# Patient Record
Sex: MALE | Race: BLACK OR AFRICAN AMERICAN | NOT HISPANIC OR LATINO
[De-identification: names, ages, dates, MRNs, and addresses within clinical notes are randomized per-mention and may not be internally consistent; named-entity substitution may affect disease eponyms.]

---

## 2017-02-24 ENCOUNTER — APPOINTMENT (OUTPATIENT)
Dept: OTHER | Facility: CLINIC | Age: 62
End: 2017-02-24

## 2017-02-24 VITALS
HEIGHT: 68 IN | BODY MASS INDEX: 29.55 KG/M2 | DIASTOLIC BLOOD PRESSURE: 69 MMHG | HEART RATE: 81 BPM | SYSTOLIC BLOOD PRESSURE: 109 MMHG | OXYGEN SATURATION: 97 % | WEIGHT: 195 LBS

## 2017-02-27 LAB
ALBUMIN SERPL ELPH-MCNC: 4.6 G/DL
ALP BLD-CCNC: 48 U/L
ALT SERPL-CCNC: 34 U/L
ANION GAP SERPL CALC-SCNC: 13 MMOL/L
APPEARANCE: CLEAR
AST SERPL-CCNC: 24 U/L
BASOPHILS # BLD AUTO: 0.03 K/UL
BASOPHILS NFR BLD AUTO: 0.7 %
BILIRUB SERPL-MCNC: 0.3 MG/DL
BILIRUBIN URINE: NEGATIVE
BLOOD URINE: NEGATIVE
BUN SERPL-MCNC: 16 MG/DL
CALCIUM SERPL-MCNC: 9.6 MG/DL
CHLORIDE SERPL-SCNC: 101 MMOL/L
CHOLEST SERPL-MCNC: 241 MG/DL
CHOLEST/HDLC SERPL: 3.5 RATIO
CO2 SERPL-SCNC: 28 MMOL/L
COLOR: YELLOW
CREAT SERPL-MCNC: 1.29 MG/DL
EOSINOPHIL # BLD AUTO: 0.44 K/UL
EOSINOPHIL NFR BLD AUTO: 10.4 %
GLUCOSE QUALITATIVE U: NORMAL MG/DL
GLUCOSE SERPL-MCNC: 99 MG/DL
HCT VFR BLD CALC: 43.2 %
HDLC SERPL-MCNC: 68 MG/DL
HGB BLD-MCNC: 14.1 G/DL
IMM GRANULOCYTES NFR BLD AUTO: 0 %
KETONES URINE: NEGATIVE
LDLC SERPL CALC-MCNC: 151 MG/DL
LEUKOCYTE ESTERASE URINE: NEGATIVE
LYMPHOCYTES # BLD AUTO: 2.15 K/UL
LYMPHOCYTES NFR BLD AUTO: 50.6 %
MAN DIFF?: NORMAL
MCHC RBC-ENTMCNC: 31.3 PG
MCHC RBC-ENTMCNC: 32.6 GM/DL
MCV RBC AUTO: 95.8 FL
MONOCYTES # BLD AUTO: 0.36 K/UL
MONOCYTES NFR BLD AUTO: 8.5 %
NEUTROPHILS # BLD AUTO: 1.27 K/UL
NEUTROPHILS NFR BLD AUTO: 29.8 %
NITRITE URINE: NEGATIVE
PH URINE: 6
PLATELET # BLD AUTO: 245 K/UL
POTASSIUM SERPL-SCNC: 4.6 MMOL/L
PROT SERPL-MCNC: 7.5 G/DL
PROTEIN URINE: NEGATIVE MG/DL
RBC # BLD: 4.51 M/UL
RBC # FLD: 13.6 %
SODIUM SERPL-SCNC: 142 MMOL/L
SPECIFIC GRAVITY URINE: 1.02
TRIGL SERPL-MCNC: 111 MG/DL
UROBILINOGEN URINE: NORMAL MG/DL
WBC # FLD AUTO: 4.25 K/UL

## 2017-04-04 ENCOUNTER — APPOINTMENT (OUTPATIENT)
Dept: UROLOGY | Facility: CLINIC | Age: 62
End: 2017-04-04

## 2017-04-04 DIAGNOSIS — Z80.42 FAMILY HISTORY OF MALIGNANT NEOPLASM OF PROSTATE: ICD-10-CM

## 2017-04-05 ENCOUNTER — TRANSCRIPTION ENCOUNTER (OUTPATIENT)
Age: 62
End: 2017-04-05

## 2017-04-05 PROBLEM — Z80.42 FAMILY HISTORY OF MALIGNANT NEOPLASM OF PROSTATE: Status: ACTIVE | Noted: 2017-04-04

## 2017-04-05 LAB
PSA FREE FLD-MCNC: 17.8 %
PSA FREE SERPL-MCNC: 1.27 NG/ML
PSA SERPL-MCNC: 7.12 NG/ML

## 2017-05-11 ENCOUNTER — FORM ENCOUNTER (OUTPATIENT)
Age: 62
End: 2017-05-11

## 2017-05-12 ENCOUNTER — OUTPATIENT (OUTPATIENT)
Dept: OUTPATIENT SERVICES | Facility: HOSPITAL | Age: 62
LOS: 1 days | End: 2017-05-12
Payer: COMMERCIAL

## 2017-05-12 ENCOUNTER — APPOINTMENT (OUTPATIENT)
Dept: MRI IMAGING | Facility: IMAGING CENTER | Age: 62
End: 2017-05-12

## 2017-05-12 DIAGNOSIS — R97.20 ELEVATED PROSTATE SPECIFIC ANTIGEN [PSA]: ICD-10-CM

## 2017-05-12 PROCEDURE — 72197 MRI PELVIS W/O & W/DYE: CPT

## 2017-05-12 PROCEDURE — A9585: CPT

## 2017-05-23 ENCOUNTER — MESSAGE (OUTPATIENT)
Age: 62
End: 2017-05-23

## 2017-05-23 ENCOUNTER — MEDICATION RENEWAL (OUTPATIENT)
Age: 62
End: 2017-05-23

## 2017-05-30 ENCOUNTER — MESSAGE (OUTPATIENT)
Age: 62
End: 2017-05-30

## 2017-06-02 ENCOUNTER — APPOINTMENT (OUTPATIENT)
Dept: UROLOGY | Facility: CLINIC | Age: 62
End: 2017-06-02

## 2017-06-02 VITALS
TEMPERATURE: 98.2 F | RESPIRATION RATE: 16 BRPM | DIASTOLIC BLOOD PRESSURE: 70 MMHG | SYSTOLIC BLOOD PRESSURE: 101 MMHG | HEART RATE: 70 BPM

## 2017-06-02 RX ORDER — AMIKACIN SULFATE 250 MG/ML
500 INJECTION, SOLUTION INTRAMUSCULAR; INTRAVENOUS
Refills: 0 | Status: COMPLETED | OUTPATIENT
Start: 2017-06-02

## 2017-06-02 RX ORDER — AMIKACIN SULFATE 250 MG/ML
500 INJECTION, SOLUTION INTRAMUSCULAR; INTRAVENOUS
Qty: 2 | Refills: 0 | Status: COMPLETED | OUTPATIENT
Start: 2017-06-02 | End: 2017-06-02

## 2017-06-02 RX ADMIN — AMIKACIN SULFATE 0 MG/2ML: 250 INJECTION, SOLUTION INTRAMUSCULAR; INTRAVENOUS at 00:00

## 2017-06-06 ENCOUNTER — OUTPATIENT (OUTPATIENT)
Dept: OUTPATIENT SERVICES | Facility: HOSPITAL | Age: 62
LOS: 1 days | End: 2017-06-06

## 2017-06-06 ENCOUNTER — MEDICATION RENEWAL (OUTPATIENT)
Age: 62
End: 2017-06-06

## 2017-06-06 VITALS
OXYGEN SATURATION: 99 % | RESPIRATION RATE: 14 BRPM | DIASTOLIC BLOOD PRESSURE: 78 MMHG | TEMPERATURE: 97 F | WEIGHT: 190.92 LBS | HEIGHT: 67.5 IN | SYSTOLIC BLOOD PRESSURE: 128 MMHG | HEART RATE: 89 BPM

## 2017-06-06 DIAGNOSIS — R97.20 ELEVATED PROSTATE SPECIFIC ANTIGEN [PSA]: ICD-10-CM

## 2017-06-06 DIAGNOSIS — Z98.890 OTHER SPECIFIED POSTPROCEDURAL STATES: Chronic | ICD-10-CM

## 2017-06-06 LAB
APPEARANCE UR: SIGNIFICANT CHANGE UP
BACTERIA # UR AUTO: SIGNIFICANT CHANGE UP
BILIRUB UR-MCNC: NEGATIVE — SIGNIFICANT CHANGE UP
BLOOD UR QL VISUAL: NEGATIVE — SIGNIFICANT CHANGE UP
BUN SERPL-MCNC: 12 MG/DL — SIGNIFICANT CHANGE UP (ref 7–23)
CALCIUM SERPL-MCNC: 9.8 MG/DL — SIGNIFICANT CHANGE UP (ref 8.4–10.5)
CHLORIDE SERPL-SCNC: 100 MMOL/L — SIGNIFICANT CHANGE UP (ref 98–107)
CO2 SERPL-SCNC: 29 MMOL/L — SIGNIFICANT CHANGE UP (ref 22–31)
COD CRY URNS QL: SIGNIFICANT CHANGE UP (ref 0–0)
COLOR SPEC: YELLOW — SIGNIFICANT CHANGE UP
CREAT SERPL-MCNC: 1.23 MG/DL — SIGNIFICANT CHANGE UP (ref 0.5–1.3)
GLUCOSE SERPL-MCNC: 96 MG/DL — SIGNIFICANT CHANGE UP (ref 70–99)
GLUCOSE UR-MCNC: NEGATIVE — SIGNIFICANT CHANGE UP
HCT VFR BLD CALC: 44.1 % — SIGNIFICANT CHANGE UP (ref 39–50)
HGB BLD-MCNC: 14 G/DL — SIGNIFICANT CHANGE UP (ref 13–17)
KETONES UR-MCNC: NEGATIVE — SIGNIFICANT CHANGE UP
LEUKOCYTE ESTERASE UR-ACNC: NEGATIVE — SIGNIFICANT CHANGE UP
MCHC RBC-ENTMCNC: 30.6 PG — SIGNIFICANT CHANGE UP (ref 27–34)
MCHC RBC-ENTMCNC: 31.7 % — LOW (ref 32–36)
MCV RBC AUTO: 96.3 FL — SIGNIFICANT CHANGE UP (ref 80–100)
MUCOUS THREADS # UR AUTO: SIGNIFICANT CHANGE UP
NITRITE UR-MCNC: NEGATIVE — SIGNIFICANT CHANGE UP
PH UR: 6 — SIGNIFICANT CHANGE UP (ref 4.6–8)
PLATELET # BLD AUTO: 265 K/UL — SIGNIFICANT CHANGE UP (ref 150–400)
PMV BLD: 10.4 FL — SIGNIFICANT CHANGE UP (ref 7–13)
POTASSIUM SERPL-MCNC: 4.4 MMOL/L — SIGNIFICANT CHANGE UP (ref 3.5–5.3)
POTASSIUM SERPL-SCNC: 4.4 MMOL/L — SIGNIFICANT CHANGE UP (ref 3.5–5.3)
PROT UR-MCNC: 20 — SIGNIFICANT CHANGE UP
RBC # BLD: 4.58 M/UL — SIGNIFICANT CHANGE UP (ref 4.2–5.8)
RBC # FLD: 13.3 % — SIGNIFICANT CHANGE UP (ref 10.3–14.5)
RBC CASTS # UR COMP ASSIST: SIGNIFICANT CHANGE UP (ref 0–?)
SODIUM SERPL-SCNC: 142 MMOL/L — SIGNIFICANT CHANGE UP (ref 135–145)
SP GR SPEC: 1.03 — SIGNIFICANT CHANGE UP (ref 1–1.03)
UROBILINOGEN FLD QL: NORMAL E.U. — SIGNIFICANT CHANGE UP (ref 0.1–0.2)
WBC # BLD: 4.87 K/UL — SIGNIFICANT CHANGE UP (ref 3.8–10.5)
WBC # FLD AUTO: 4.87 K/UL — SIGNIFICANT CHANGE UP (ref 3.8–10.5)
WBC UR QL: SIGNIFICANT CHANGE UP (ref 0–?)

## 2017-06-06 NOTE — H&P PST ADULT - NEGATIVE GENERAL GENITOURINARY SYMPTOMS
normal urinary frequency/no flank pain R/no urinary hesitancy/no flank pain L/no dysuria/no nocturia/no hematuria/no incontinence

## 2017-06-06 NOTE — H&P PST ADULT - NEGATIVE GASTROINTESTINAL SYMPTOMS
no constipation/no nausea/no vomiting/no diarrhea/no melena/no abdominal pain/no change in bowel habits

## 2017-06-06 NOTE — H&P PST ADULT - HISTORY OF PRESENT ILLNESS
61 y/o male presents to [facility] for preoperative evaluation with diagnosis of elevated prostate specific antigen (PSA).  Pt reports 1 month ago his PSA results were elevated. Sent for MRI by his Urologist which did "not show anything" but patient was told he needed a simple biopsy. He is Scheduled for Transurethral Resection Ultrasound and Prostate Biopsy on 6/7/2017. Pt denies hematuria, urinary frequency, urgency or nocturia. 59 y/o male presents to [facility] for preoperative evaluation with diagnosis of elevated prostate specific antigen (PSA).  Pt reports 1 month ago his PSA results were elevated. Sent for MRI by his Urologist which did "not show anything" but patient was told he needed a "simple biopsy". He is Scheduled for Transurethral Resection Ultrasound and Prostate Biopsy on 6/7/2017. Pt denies hematuria, urinary frequency, urgency or nocturia.

## 2017-06-06 NOTE — H&P PST ADULT - NEGATIVE MUSCULOSKELETAL SYMPTOMS
no muscle cramps/no arthralgia/no muscle weakness/no arthritis/no joint swelling/no neck pain/no back pain

## 2017-06-06 NOTE — H&P PST ADULT - RS GEN PE MLT RESP DETAILS PC
respirations non-labored/no chest wall tenderness/breath sounds equal/no wheezes/good air movement/clear to auscultation bilaterally/airway patent

## 2017-06-06 NOTE — H&P PST ADULT - NEGATIVE ENMT SYMPTOMS
no post-nasal discharge/no nose bleeds/no nasal congestion/no dysphagia/no hearing difficulty/no tinnitus/no gum bleeding/no ear pain

## 2017-06-06 NOTE — H&P PST ADULT - NSANTHOSAYNRD_GEN_A_CORE
No. MICKI screening performed.  STOP BANG Legend: 0-2 = LOW Risk; 3-4 = INTERMEDIATE Risk; 5-8 = HIGH Risk

## 2017-06-06 NOTE — H&P PST ADULT - NEGATIVE CARDIOVASCULAR SYMPTOMS
no orthopnea/no paroxysmal nocturnal dyspnea/no dyspnea on exertion/no palpitations/no chest pain/no peripheral edema/no claudication

## 2017-06-06 NOTE — H&P PST ADULT - VISION (WITH CORRECTIVE LENSES IF THE PATIENT USUALLY WEARS THEM):
wears glasses for distance and reading/Normal vision: sees adequately in most situations; can see medication labels, newsprint

## 2017-06-06 NOTE — H&P PST ADULT - NEGATIVE NEUROLOGICAL SYMPTOMS
no loss of consciousness/no vertigo/no hemiparesis/no weakness/no headache/no confusion/no syncope/no difficulty walking/no facial palsy/no generalized seizures/no paresthesias/no tremors/no loss of sensation/no transient paralysis/no focal seizures

## 2017-06-06 NOTE — H&P PST ADULT - PROBLEM SELECTOR PLAN 1
Scheduled for Transurethral Resection Ultrasound and Prostate Biopsy on 6/7/2017.  Pre op instructions given, pt verbalized understanding   GI prophylaxis provided

## 2017-06-06 NOTE — H&P PST ADULT - LYMPHATIC
supraclavicular R/supraclavicular L/anterior cervical L/anterior cervical R/posterior cervical R/posterior cervical L

## 2017-06-07 ENCOUNTER — OUTPATIENT (OUTPATIENT)
Dept: OUTPATIENT SERVICES | Facility: HOSPITAL | Age: 62
LOS: 1 days | Discharge: HOME | End: 2017-06-07
Payer: COMMERCIAL

## 2017-06-07 ENCOUNTER — APPOINTMENT (OUTPATIENT)
Dept: UROLOGY | Facility: AMBULATORY SURGERY CENTER | Age: 62
End: 2017-06-07

## 2017-06-07 ENCOUNTER — RESULT REVIEW (OUTPATIENT)
Age: 62
End: 2017-06-07

## 2017-06-07 VITALS
WEIGHT: 190.92 LBS | SYSTOLIC BLOOD PRESSURE: 119 MMHG | TEMPERATURE: 97 F | DIASTOLIC BLOOD PRESSURE: 79 MMHG | HEART RATE: 73 BPM | HEIGHT: 67.5 IN | OXYGEN SATURATION: 99 % | RESPIRATION RATE: 18 BRPM

## 2017-06-07 VITALS
HEART RATE: 70 BPM | RESPIRATION RATE: 18 BRPM | OXYGEN SATURATION: 100 % | SYSTOLIC BLOOD PRESSURE: 118 MMHG | DIASTOLIC BLOOD PRESSURE: 80 MMHG

## 2017-06-07 DIAGNOSIS — R97.20 ELEVATED PROSTATE SPECIFIC ANTIGEN [PSA]: ICD-10-CM

## 2017-06-07 DIAGNOSIS — Z98.890 OTHER SPECIFIED POSTPROCEDURAL STATES: Chronic | ICD-10-CM

## 2017-06-07 LAB — SPECIMEN SOURCE: SIGNIFICANT CHANGE UP

## 2017-06-07 PROCEDURE — 76942 ECHO GUIDE FOR BIOPSY: CPT | Mod: 26,59

## 2017-06-07 PROCEDURE — 88305 TISSUE EXAM BY PATHOLOGIST: CPT | Mod: 26

## 2017-06-07 PROCEDURE — 55700: CPT

## 2017-06-07 PROCEDURE — 76872 US TRANSRECTAL: CPT | Mod: 26

## 2017-06-07 NOTE — BRIEF OPERATIVE NOTE - PROCEDURE
Prostate biopsy, needle, multiple  06/07/2017  Transrectal ultrasound and prostate biopsy  Active  [id number]

## 2017-06-07 NOTE — ASU DISCHARGE PLAN (ADULT/PEDIATRIC). - NOTIFY
Persistent Nausea and Vomiting/Bleeding that does not stop/Unable to Urinate/Pain not relieved by Medications/Fever greater than 101

## 2017-06-08 ENCOUNTER — TRANSCRIPTION ENCOUNTER (OUTPATIENT)
Age: 62
End: 2017-06-08

## 2017-06-08 LAB — BACTERIA UR CULT: SIGNIFICANT CHANGE UP

## 2017-06-12 LAB — SURGICAL PATHOLOGY STUDY: SIGNIFICANT CHANGE UP

## 2017-08-11 ENCOUNTER — APPOINTMENT (OUTPATIENT)
Dept: UROLOGY | Facility: CLINIC | Age: 62
End: 2017-08-11
Payer: COMMERCIAL

## 2017-08-11 PROCEDURE — 99214 OFFICE O/P EST MOD 30 MIN: CPT

## 2018-01-03 ENCOUNTER — MESSAGE (OUTPATIENT)
Age: 63
End: 2018-01-03

## 2018-01-30 ENCOUNTER — APPOINTMENT (OUTPATIENT)
Dept: OTHER | Facility: CLINIC | Age: 63
End: 2018-01-30

## 2018-01-30 ENCOUNTER — APPOINTMENT (OUTPATIENT)
Dept: UROLOGY | Facility: CLINIC | Age: 63
End: 2018-01-30
Payer: COMMERCIAL

## 2018-01-30 ENCOUNTER — APPOINTMENT (OUTPATIENT)
Dept: OTHER | Facility: CLINIC | Age: 63
End: 2018-01-30
Payer: COMMERCIAL

## 2018-01-30 VITALS
DIASTOLIC BLOOD PRESSURE: 78 MMHG | BODY MASS INDEX: 27.92 KG/M2 | SYSTOLIC BLOOD PRESSURE: 117 MMHG | RESPIRATION RATE: 16 BRPM | WEIGHT: 180 LBS | HEART RATE: 81 BPM | OXYGEN SATURATION: 99 % | HEIGHT: 67.5 IN

## 2018-01-30 DIAGNOSIS — Z04.8 ENCOUNTER FOR EXAMINATION AND OBSERVATION FOR OTHER SPECIFIED REASONS: ICD-10-CM

## 2018-01-30 PROCEDURE — 99213 OFFICE O/P EST LOW 20 MIN: CPT

## 2018-01-30 PROCEDURE — 99396 PREV VISIT EST AGE 40-64: CPT

## 2018-01-30 PROCEDURE — 96150: CPT

## 2018-01-30 PROCEDURE — 94010 BREATHING CAPACITY TEST: CPT

## 2018-01-30 PROCEDURE — 99214 OFFICE O/P EST MOD 30 MIN: CPT | Mod: 25

## 2018-01-30 RX ORDER — AMOXICILLIN AND CLAVULANATE POTASSIUM 875; 125 MG/1; MG/1
875-125 TABLET, COATED ORAL
Qty: 3 | Refills: 0 | Status: DISCONTINUED | COMMUNITY
Start: 2017-05-23 | End: 2018-01-30

## 2018-01-30 RX ORDER — ROSUVASTATIN CALCIUM 10 MG/1
10 TABLET, FILM COATED ORAL
Refills: 0 | Status: ACTIVE | COMMUNITY
Start: 2018-01-30

## 2018-01-31 LAB
ALBUMIN SERPL ELPH-MCNC: 4.5 G/DL
ALP BLD-CCNC: 49 U/L
ALT SERPL-CCNC: 23 U/L
ANION GAP SERPL CALC-SCNC: 12 MMOL/L
APPEARANCE: CLEAR
AST SERPL-CCNC: 26 U/L
BASOPHILS # BLD AUTO: 0.05 K/UL
BASOPHILS NFR BLD AUTO: 1.1 %
BILIRUB SERPL-MCNC: 0.3 MG/DL
BILIRUBIN URINE: NEGATIVE
BLOOD URINE: ABNORMAL
BUN SERPL-MCNC: 13 MG/DL
CALCIUM SERPL-MCNC: 10 MG/DL
CHLORIDE SERPL-SCNC: 100 MMOL/L
CHOLEST SERPL-MCNC: 261 MG/DL
CHOLEST/HDLC SERPL: 3.4 RATIO
CO2 SERPL-SCNC: 28 MMOL/L
COLOR: YELLOW
CREAT SERPL-MCNC: 1.42 MG/DL
EOSINOPHIL # BLD AUTO: 0.52 K/UL
EOSINOPHIL NFR BLD AUTO: 11 %
GLUCOSE QUALITATIVE U: NEGATIVE MG/DL
GLUCOSE SERPL-MCNC: 90 MG/DL
HCT VFR BLD CALC: 45.7 %
HDLC SERPL-MCNC: 77 MG/DL
HGB BLD-MCNC: 14.8 G/DL
IMM GRANULOCYTES NFR BLD AUTO: 0.2 %
KETONES URINE: NEGATIVE
LDLC SERPL CALC-MCNC: 173 MG/DL
LEUKOCYTE ESTERASE URINE: NEGATIVE
LYMPHOCYTES # BLD AUTO: 1.48 K/UL
LYMPHOCYTES NFR BLD AUTO: 31.4 %
MAN DIFF?: NORMAL
MCHC RBC-ENTMCNC: 31.3 PG
MCHC RBC-ENTMCNC: 32.4 GM/DL
MCV RBC AUTO: 96.6 FL
MONOCYTES # BLD AUTO: 0.32 K/UL
MONOCYTES NFR BLD AUTO: 6.8 %
NEUTROPHILS # BLD AUTO: 2.34 K/UL
NEUTROPHILS NFR BLD AUTO: 49.5 %
NITRITE URINE: NEGATIVE
PH URINE: 6
PLATELET # BLD AUTO: 258 K/UL
POTASSIUM SERPL-SCNC: 4.7 MMOL/L
PROT SERPL-MCNC: 7.8 G/DL
PROTEIN URINE: NEGATIVE MG/DL
RBC # BLD: 4.73 M/UL
RBC # FLD: 13.6 %
SODIUM SERPL-SCNC: 140 MMOL/L
SPECIFIC GRAVITY URINE: 1.02
TRIGL SERPL-MCNC: 56 MG/DL
UROBILINOGEN URINE: NEGATIVE MG/DL
WBC # FLD AUTO: 4.72 K/UL

## 2018-02-01 LAB
PSA FREE FLD-MCNC: 10.2
PSA FREE SERPL-MCNC: 0.66 NG/ML
PSA SERPL-MCNC: 6.46 NG/ML

## 2018-07-25 PROBLEM — R97.20 ELEVATED PROSTATE SPECIFIC ANTIGEN [PSA]: Chronic | Status: ACTIVE | Noted: 2017-06-06

## 2018-08-28 ENCOUNTER — APPOINTMENT (OUTPATIENT)
Dept: UROLOGY | Facility: CLINIC | Age: 63
End: 2018-08-28

## 2018-11-06 ENCOUNTER — APPOINTMENT (OUTPATIENT)
Dept: UROLOGY | Facility: CLINIC | Age: 63
End: 2018-11-06
Payer: COMMERCIAL

## 2018-11-06 ENCOUNTER — APPOINTMENT (OUTPATIENT)
Dept: UROLOGY | Facility: CLINIC | Age: 63
End: 2018-11-06

## 2018-11-06 VITALS
HEIGHT: 67 IN | WEIGHT: 176 LBS | HEART RATE: 93 BPM | TEMPERATURE: 98 F | DIASTOLIC BLOOD PRESSURE: 69 MMHG | BODY MASS INDEX: 27.62 KG/M2 | SYSTOLIC BLOOD PRESSURE: 106 MMHG | RESPIRATION RATE: 17 BRPM

## 2018-11-06 PROCEDURE — 99213 OFFICE O/P EST LOW 20 MIN: CPT

## 2018-11-07 ENCOUNTER — FORM ENCOUNTER (OUTPATIENT)
Age: 63
End: 2018-11-07

## 2018-11-07 LAB
PSA FREE FLD-MCNC: 11
PSA FREE SERPL-MCNC: 1.06 NG/ML
PSA SERPL-MCNC: 9.67 NG/ML

## 2018-11-24 ENCOUNTER — FORM ENCOUNTER (OUTPATIENT)
Age: 63
End: 2018-11-24

## 2018-11-25 ENCOUNTER — OUTPATIENT (OUTPATIENT)
Dept: OUTPATIENT SERVICES | Facility: HOSPITAL | Age: 63
LOS: 1 days | End: 2018-11-25
Payer: COMMERCIAL

## 2018-11-25 ENCOUNTER — APPOINTMENT (OUTPATIENT)
Dept: MRI IMAGING | Facility: IMAGING CENTER | Age: 63
End: 2018-11-25
Payer: COMMERCIAL

## 2018-11-25 DIAGNOSIS — Z98.890 OTHER SPECIFIED POSTPROCEDURAL STATES: Chronic | ICD-10-CM

## 2018-11-25 DIAGNOSIS — Z00.8 ENCOUNTER FOR OTHER GENERAL EXAMINATION: ICD-10-CM

## 2018-11-25 PROCEDURE — 72197 MRI PELVIS W/O & W/DYE: CPT | Mod: 26

## 2018-11-25 PROCEDURE — A9585: CPT

## 2018-11-25 PROCEDURE — 82565 ASSAY OF CREATININE: CPT

## 2018-11-25 PROCEDURE — 72197 MRI PELVIS W/O & W/DYE: CPT

## 2018-11-30 ENCOUNTER — APPOINTMENT (OUTPATIENT)
Dept: MRI IMAGING | Facility: IMAGING CENTER | Age: 63
End: 2018-11-30

## 2019-04-08 ENCOUNTER — RX RENEWAL (OUTPATIENT)
Age: 64
End: 2019-04-08

## 2019-04-09 ENCOUNTER — OUTPATIENT (OUTPATIENT)
Dept: OUTPATIENT SERVICES | Facility: HOSPITAL | Age: 64
LOS: 1 days | End: 2019-04-09
Payer: COMMERCIAL

## 2019-04-09 VITALS
TEMPERATURE: 98 F | HEIGHT: 68 IN | SYSTOLIC BLOOD PRESSURE: 108 MMHG | HEART RATE: 73 BPM | OXYGEN SATURATION: 98 % | WEIGHT: 188.05 LBS | RESPIRATION RATE: 16 BRPM | DIASTOLIC BLOOD PRESSURE: 80 MMHG

## 2019-04-09 DIAGNOSIS — C61 MALIGNANT NEOPLASM OF PROSTATE: ICD-10-CM

## 2019-04-09 DIAGNOSIS — Z98.890 OTHER SPECIFIED POSTPROCEDURAL STATES: Chronic | ICD-10-CM

## 2019-04-09 LAB
ANION GAP SERPL CALC-SCNC: 10 MMO/L — SIGNIFICANT CHANGE UP (ref 7–14)
BUN SERPL-MCNC: 13 MG/DL — SIGNIFICANT CHANGE UP (ref 7–23)
CALCIUM SERPL-MCNC: 10.3 MG/DL — SIGNIFICANT CHANGE UP (ref 8.4–10.5)
CHLORIDE SERPL-SCNC: 101 MMOL/L — SIGNIFICANT CHANGE UP (ref 98–107)
CO2 SERPL-SCNC: 27 MMOL/L — SIGNIFICANT CHANGE UP (ref 22–31)
CREAT SERPL-MCNC: 1.3 MG/DL — SIGNIFICANT CHANGE UP (ref 0.5–1.3)
GLUCOSE SERPL-MCNC: 64 MG/DL — LOW (ref 70–99)
HCT VFR BLD CALC: 45.5 % — SIGNIFICANT CHANGE UP (ref 39–50)
HGB BLD-MCNC: 14.9 G/DL — SIGNIFICANT CHANGE UP (ref 13–17)
MCHC RBC-ENTMCNC: 31.4 PG — SIGNIFICANT CHANGE UP (ref 27–34)
MCHC RBC-ENTMCNC: 32.7 % — SIGNIFICANT CHANGE UP (ref 32–36)
MCV RBC AUTO: 95.8 FL — SIGNIFICANT CHANGE UP (ref 80–100)
NRBC # FLD: 0 K/UL — SIGNIFICANT CHANGE UP (ref 0–0)
PLATELET # BLD AUTO: 217 K/UL — SIGNIFICANT CHANGE UP (ref 150–400)
PMV BLD: 10.4 FL — SIGNIFICANT CHANGE UP (ref 7–13)
POTASSIUM SERPL-MCNC: 4.7 MMOL/L — SIGNIFICANT CHANGE UP (ref 3.5–5.3)
POTASSIUM SERPL-SCNC: 4.7 MMOL/L — SIGNIFICANT CHANGE UP (ref 3.5–5.3)
RBC # BLD: 4.75 M/UL — SIGNIFICANT CHANGE UP (ref 4.2–5.8)
RBC # FLD: 12.7 % — SIGNIFICANT CHANGE UP (ref 10.3–14.5)
SODIUM SERPL-SCNC: 138 MMOL/L — SIGNIFICANT CHANGE UP (ref 135–145)
WBC # BLD: 4.39 K/UL — SIGNIFICANT CHANGE UP (ref 3.8–10.5)
WBC # FLD AUTO: 4.39 K/UL — SIGNIFICANT CHANGE UP (ref 3.8–10.5)

## 2019-04-09 PROCEDURE — 93010 ELECTROCARDIOGRAM REPORT: CPT

## 2019-04-09 NOTE — H&P PST ADULT - RS GEN PE MLT RESP DETAILS PC
clear to auscultation bilaterally/no wheezes/airway patent/breath sounds equal/good air movement/respirations non-labored/no chest wall tenderness

## 2019-04-09 NOTE — H&P PST ADULT - NEGATIVE GENERAL GENITOURINARY SYMPTOMS
no flank pain R/no urinary hesitancy/no hematuria/no nocturia/no dysuria/no flank pain L/no incontinence/normal urinary frequency

## 2019-04-09 NOTE — H&P PST ADULT - NSICDXFAMILYHX_GEN_ALL_CORE_FT
FAMILY HISTORY:  Father  Still living? Unknown  Family history of prostate cancer in father, Age at diagnosis: Age Unknown

## 2019-04-09 NOTE — H&P PST ADULT - ACTIVITY
uses treadmill daily x2 miles, pushups, climbs stairs w/o SOB, ADLs walks 2 miles daily on treadmill, pushups, climbs stairs w/o SOB, ADLs

## 2019-04-09 NOTE — H&P PST ADULT - NEGATIVE NEUROLOGICAL SYMPTOMS
no generalized seizures/no paresthesias/no vertigo/no loss of consciousness/no hemiparesis/no confusion/no transient paralysis/no weakness/no syncope/no tremors/no headache/no focal seizures/no loss of sensation/no difficulty walking/no facial palsy

## 2019-04-09 NOTE — H&P PST ADULT - NEGATIVE MUSCULOSKELETAL SYMPTOMS
no arm pain L/no back pain/no muscle cramps/no muscle weakness/no arthralgia/no arthritis/no joint swelling/no arm pain R

## 2019-04-09 NOTE — H&P PST ADULT - NSICDXPROBLEM_GEN_ALL_CORE_FT
PROBLEM DIAGNOSES  Problem: Malignant neoplasm of prostate  Assessment and Plan: Scheduled for Transrectal Ultrasonography and Prostate Biopsy on 4/18/2019.  Preop instructions given- pt verbalized understanding   GI prophylaxis provided

## 2019-04-09 NOTE — H&P PST ADULT - NEGATIVE CARDIOVASCULAR SYMPTOMS
no claudication/no peripheral edema/no orthopnea/no paroxysmal nocturnal dyspnea/no chest pain/no palpitations/no dyspnea on exertion

## 2019-04-09 NOTE — H&P PST ADULT - HISTORY OF PRESENT ILLNESS
64 y/o male presents to [facility] for preoperative evaluation with diagnosis of malignant neoplasm of prostate. h/o prostate biopsy in 2017. Pt presents for routine follow up. Scheduled for Transrectal Ultrasonography and Prostate Biopsy on 9/27/2019. Pt denies hematuria, urinary frequency, urgency or nocturia at present. 62 y/o male presents to [facility] for preoperative evaluation with diagnosis of malignant neoplasm of prostate. h/o prostate biopsy in 2017. Pt presents for routine follow up. Scheduled for Transrectal Ultrasonography and Prostate Biopsy on 4/18/2019. Pt denies hematuria, urinary frequency, urgency or nocturia at present.

## 2019-04-11 ENCOUNTER — APPOINTMENT (OUTPATIENT)
Dept: OTHER | Facility: CLINIC | Age: 64
End: 2019-04-11
Payer: COMMERCIAL

## 2019-04-11 PROBLEM — C61 MALIGNANT NEOPLASM OF PROSTATE: Chronic | Status: ACTIVE | Noted: 2019-04-09

## 2019-04-11 PROCEDURE — 94010 BREATHING CAPACITY TEST: CPT

## 2019-04-11 PROCEDURE — 96150: CPT

## 2019-04-11 PROCEDURE — 99214 OFFICE O/P EST MOD 30 MIN: CPT | Mod: 25

## 2019-04-11 PROCEDURE — 99396 PREV VISIT EST AGE 40-64: CPT | Mod: 25

## 2019-04-11 RX ORDER — CEFUROXIME AXETIL 500 MG/1
500 TABLET ORAL
Qty: 6 | Refills: 0 | Status: DISCONTINUED | COMMUNITY
Start: 2018-08-28 | End: 2019-04-11

## 2019-04-11 RX ORDER — CEFUROXIME AXETIL 500 MG/1
500 TABLET ORAL
Qty: 6 | Refills: 0 | Status: DISCONTINUED | COMMUNITY
Start: 2019-04-08 | End: 2019-04-11

## 2019-04-11 RX ORDER — DIPHENHYDRAMINE HCL 25 MG/1
25 TABLET ORAL
Qty: 30 | Refills: 3 | Status: DISCONTINUED | COMMUNITY
Start: 2018-01-30 | End: 2019-04-11

## 2019-04-11 NOTE — PHYSICAL EXAM
[General Appearance - Alert] : alert [Sclera] : the sclera and conjunctiva were normal [General Appearance - In No Acute Distress] : in no acute distress [PERRL With Normal Accommodation] : pupils were equal in size, round, and reactive to light [Extraocular Movements] : extraocular movements were intact [Nasal Cavity] : the nasal mucosa and septum were normal [Oropharynx] : the oropharynx was normal [FreeTextEntry1] : SIRENA ear cerumen , removed with ear curette from LEft ear  [Neck Appearance] : the appearance of the neck was normal [Jugular Venous Distention Increased] : there was no jugular-venous distention [Neck Cervical Mass (___cm)] : no neck mass was observed [Thyroid Diffuse Enlargement] : the thyroid was not enlarged [Thyroid Nodule] : there were no palpable thyroid nodules [Auscultation Breath Sounds / Voice Sounds] : lungs were clear to auscultation bilaterally [Heart Sounds] : normal S1 and S2 [Heart Rate And Rhythm] : heart rate was normal and rhythm regular [Heart Sounds Gallop] : no gallops [Heart Sounds Pericardial Friction Rub] : no pericardial rub [Murmurs] : no murmurs [Full Pulse] : the pedal pulses are present [Edema] : there was no peripheral edema [Bowel Sounds] : normal bowel sounds [Abdomen Soft] : soft [Abdomen Tenderness] : non-tender [] : no hepato-splenomegaly [Abdomen Mass (___ Cm)] : no abdominal mass palpated [Oriented To Time, Place, And Person] : oriented to person, place, and time [Impaired Insight] : insight and judgment were intact [Affect] : the affect was normal

## 2019-04-11 NOTE — REASON FOR VISIT
[Follow-Up] : a follow-up visit [FreeTextEntry1] : RADS, Rhinitis , CAP- all certified as WTC relate by [employer]

## 2019-04-11 NOTE — HISTORY OF PRESENT ILLNESS
[FreeTextEntry1] : [place]ation: [place]  \par \par \par HPI: \par \par Nasal congestion improved with use of PO Benadryl and Flonase, he is using it on and off \par NO recent  infections\par \par Uses albuterol PRN for SOB once/ twice  a week , feels it is effective itchy eyes ad watery eyes \par c/o \par IN 10 2001 started coughing, had CXR , was told had “ particles”, was RX inhalers, cough went away,\par Started having i since winter 2002 on and off, once or twice a week, lasts few hours, takes Benadryl and it helps \par \par Biopsy 6/17 demonstrated 3 positive cores: 1 grade 2 and 2 grade 2. he opted for AS for the time being.\par His f/u delayed as has spent more time in [facility] dealing with family issues. HAd been booked for biopsy but wanted sedation.\par No LUTs as before. \par \par \par \par spirometry : NL , no change \par \par Colonoscopy: 2016\par \par Allergies: only if takes ASA and caffeinated Coffee /face and body rash/ , or shrimp and coffee/ face swelling and itching / with difficulties breathing\par \par Soc Hx:  with 5 kids\par \par \par PCP: Pablito Horta \par \par he is rescheduled to have another prostate Bx next week

## 2019-04-11 NOTE — HISTORY OF PRESENT ILLNESS
[FreeTextEntry1] : [place]ation: [place]  \par \par \par HPI: \par \par Nasal congestion improved with use of PO Benadryl and Flonase, he is using it on and off \par NO recent  infections\par \par Uses albuterol PRN for SOB once/ twice  a week , feels it is effective itchy eyes ad watery eyes \par c/o \par IN 10 2001 started coughing, had CXR , was told had “ particles”, was RX inhalers, cough went away,\par Started having i since winter 2002 on and off, once or twice a week, lasts few hours, takes Benadryl and it helps \par \par Biopsy 6/17 demonstrated 3 positive cores: 1 grade 2 and 2 grade 2. he opted for AS for the time being.\par His f/u delayed as has spent more time in [facility] dealing with family issues. HAd been booked for biopsy but wanted sedation.\par No LUTs as before. \par \par \par \par spirometry : NL , no change \par \par Colonoscopy: 2016\par \par Allergies: only if takes ASA and caffeinated Coffee /face and body rash/ , or shrimp and coffee/ face swelling and itching / with difficulties breathing\par \par Soc Hx:  with 5 kids\par \par \par PCP: Pablito Horta \par \par he is rescheduled to have another prostate Bx next week

## 2019-04-11 NOTE — PHYSICAL EXAM
[General Appearance - Alert] : alert [General Appearance - In No Acute Distress] : in no acute distress [Sclera] : the sclera and conjunctiva were normal [PERRL With Normal Accommodation] : pupils were equal in size, round, and reactive to light [Extraocular Movements] : extraocular movements were intact [Nasal Cavity] : the nasal mucosa and septum were normal [FreeTextEntry1] : SIRENA ear cerumen , removed with ear curette from LEft ear  [Oropharynx] : the oropharynx was normal [Neck Appearance] : the appearance of the neck was normal [Jugular Venous Distention Increased] : there was no jugular-venous distention [Neck Cervical Mass (___cm)] : no neck mass was observed [Thyroid Nodule] : there were no palpable thyroid nodules [Thyroid Diffuse Enlargement] : the thyroid was not enlarged [Auscultation Breath Sounds / Voice Sounds] : lungs were clear to auscultation bilaterally [Heart Sounds Gallop] : no gallops [Heart Rate And Rhythm] : heart rate was normal and rhythm regular [Heart Sounds] : normal S1 and S2 [Heart Sounds Pericardial Friction Rub] : no pericardial rub [Full Pulse] : the pedal pulses are present [Murmurs] : no murmurs [Bowel Sounds] : normal bowel sounds [Edema] : there was no peripheral edema [Abdomen Soft] : soft [Abdomen Tenderness] : non-tender [] : no hepato-splenomegaly [Abdomen Mass (___ Cm)] : no abdominal mass palpated [Oriented To Time, Place, And Person] : oriented to person, place, and time [Affect] : the affect was normal [Impaired Insight] : insight and judgment were intact

## 2019-04-12 LAB
ALBUMIN SERPL ELPH-MCNC: 4.5 G/DL
ALP BLD-CCNC: 52 U/L
ALT SERPL-CCNC: 29 U/L
ANION GAP SERPL CALC-SCNC: 11 MMOL/L
APPEARANCE: CLEAR
AST SERPL-CCNC: 23 U/L
BACTERIA: NEGATIVE
BASOPHILS # BLD AUTO: 0.04 K/UL
BASOPHILS NFR BLD AUTO: 1 %
BILIRUB SERPL-MCNC: 0.3 MG/DL
BILIRUBIN URINE: NEGATIVE
BLOOD URINE: NEGATIVE
BUN SERPL-MCNC: 11 MG/DL
CALCIUM SERPL-MCNC: 10.3 MG/DL
CHLORIDE SERPL-SCNC: 101 MMOL/L
CHOLEST SERPL-MCNC: 298 MG/DL
CHOLEST/HDLC SERPL: 3.7 RATIO
CO2 SERPL-SCNC: 29 MMOL/L
COLOR: NORMAL
CREAT SERPL-MCNC: 1.32 MG/DL
EOSINOPHIL # BLD AUTO: 0.32 K/UL
EOSINOPHIL NFR BLD AUTO: 8 %
GLUCOSE QUALITATIVE U: NEGATIVE
GLUCOSE SERPL-MCNC: 95 MG/DL
HCT VFR BLD CALC: 47.3 %
HDLC SERPL-MCNC: 81 MG/DL
HGB BLD-MCNC: 15.2 G/DL
HYALINE CASTS: 0 /LPF
IMM GRANULOCYTES NFR BLD AUTO: 0 %
KETONES URINE: NEGATIVE
LDLC SERPL CALC-MCNC: 205 MG/DL
LEUKOCYTE ESTERASE URINE: NEGATIVE
LYMPHOCYTES # BLD AUTO: 1.87 K/UL
LYMPHOCYTES NFR BLD AUTO: 46.5 %
MAN DIFF?: NORMAL
MCHC RBC-ENTMCNC: 31.8 PG
MCHC RBC-ENTMCNC: 32.1 GM/DL
MCV RBC AUTO: 99 FL
MICROSCOPIC-UA: NORMAL
MONOCYTES # BLD AUTO: 0.5 K/UL
MONOCYTES NFR BLD AUTO: 12.4 %
NEUTROPHILS # BLD AUTO: 1.29 K/UL
NEUTROPHILS NFR BLD AUTO: 32.1 %
NITRITE URINE: NEGATIVE
PH URINE: 6.5
PLATELET # BLD AUTO: 216 K/UL
POTASSIUM SERPL-SCNC: 5.2 MMOL/L
PROT SERPL-MCNC: 7.7 G/DL
PROTEIN URINE: NEGATIVE
RBC # BLD: 4.78 M/UL
RBC # FLD: 12.9 %
RED BLOOD CELLS URINE: 0 /HPF
SODIUM SERPL-SCNC: 141 MMOL/L
SPECIFIC GRAVITY URINE: 1.02
SQUAMOUS EPITHELIAL CELLS: 0 /HPF
TRIGL SERPL-MCNC: 60 MG/DL
UROBILINOGEN URINE: NORMAL
WBC # FLD AUTO: 4.02 K/UL
WHITE BLOOD CELLS URINE: 0 /HPF

## 2019-04-17 ENCOUNTER — TRANSCRIPTION ENCOUNTER (OUTPATIENT)
Age: 64
End: 2019-04-17

## 2019-04-18 ENCOUNTER — OUTPATIENT (OUTPATIENT)
Dept: OUTPATIENT SERVICES | Facility: HOSPITAL | Age: 64
LOS: 1 days | Discharge: HOME | End: 2019-04-18
Payer: COMMERCIAL

## 2019-04-18 ENCOUNTER — APPOINTMENT (OUTPATIENT)
Dept: UROLOGY | Facility: AMBULATORY SURGERY CENTER | Age: 64
End: 2019-04-18

## 2019-04-18 ENCOUNTER — RESULT REVIEW (OUTPATIENT)
Age: 64
End: 2019-04-18

## 2019-04-18 VITALS
DIASTOLIC BLOOD PRESSURE: 77 MMHG | WEIGHT: 188.05 LBS | HEIGHT: 68 IN | HEART RATE: 77 BPM | OXYGEN SATURATION: 97 % | SYSTOLIC BLOOD PRESSURE: 103 MMHG | TEMPERATURE: 98 F | RESPIRATION RATE: 16 BRPM

## 2019-04-18 VITALS
TEMPERATURE: 98 F | SYSTOLIC BLOOD PRESSURE: 103 MMHG | HEART RATE: 69 BPM | DIASTOLIC BLOOD PRESSURE: 86 MMHG | OXYGEN SATURATION: 100 %

## 2019-04-18 DIAGNOSIS — Z98.890 OTHER SPECIFIED POSTPROCEDURAL STATES: Chronic | ICD-10-CM

## 2019-04-18 DIAGNOSIS — C61 MALIGNANT NEOPLASM OF PROSTATE: ICD-10-CM

## 2019-04-18 PROCEDURE — 88305 TISSUE EXAM BY PATHOLOGIST: CPT | Mod: 26

## 2019-04-18 PROCEDURE — 55700: CPT

## 2019-04-18 PROCEDURE — 76942 ECHO GUIDE FOR BIOPSY: CPT | Mod: 26,59

## 2019-04-18 PROCEDURE — 76872 US TRANSRECTAL: CPT | Mod: 26

## 2019-04-18 NOTE — ASU DISCHARGE PLAN (ADULT/PEDIATRIC) - CARE PROVIDER_API CALL
Romeo Saucedo)  Urology  [address] [place] [address]  Phone: [phone]  Fax: [phone]  Follow Up Time:

## 2019-04-18 NOTE — ASU DISCHARGE PLAN (ADULT/PEDIATRIC) - CALL YOUR DOCTOR IF YOU HAVE ANY OF THE FOLLOWING:
Bleeding that does not stop/Fever greater than (need to indicate Fahrenheit or Celsius)/Pain not relieved by Medications/Excessive diarrhea/Inability to tolerate liquids or foods/Nausea and vomiting that does not stop/Unable to urinate/Increased irritability or sluggishness

## 2019-04-18 NOTE — BRIEF OPERATIVE NOTE - NSICDXBRIEFPROCEDURE_GEN_ALL_CORE_FT
PROCEDURES:  Transrectal ultrasound 18-Apr-2019 13:54:54  [place] Saucedo  Prostate needle biopsy 18-Apr-2019 13:54:38  [place] Saucedo

## 2019-04-22 LAB — SURGICAL PATHOLOGY STUDY: SIGNIFICANT CHANGE UP

## 2019-04-26 ENCOUNTER — APPOINTMENT (OUTPATIENT)
Dept: UROLOGY | Facility: CLINIC | Age: 64
End: 2019-04-26
Payer: COMMERCIAL

## 2019-04-26 PROCEDURE — 99214 OFFICE O/P EST MOD 30 MIN: CPT

## 2019-04-29 NOTE — HISTORY OF PRESENT ILLNESS
[FreeTextEntry1] : Patient seen for  an elevated PSA of 7.4. In 2015 it was 4; he had a "borderline" MRI but no biopsy. His father had prostate cancer in his 80's - ? metastatic. Repeat PSA was 7.1 with %free 17%. MRI noted 36cc prostate with no suspicious lesions. However i had recommended a biopsy given his race, FH and not favorable PSA density.\par \par Biopsy 6/17 demonstrated 3 positive cores: 1 grade 2 and 2 grade 2. he opted for AS for the time being.\par His f/u delayed as has spent more time in [facility] dealing with family issues. HAd been booked for biopsy but wanted sedation.\par No LUTs as before. \par  Here to review next steps given most recent Biopsy notes more significant disease\par

## 2019-04-29 NOTE — ASSESSMENT
[FreeTextEntry1] : biopsy notes mixture of grade 2 and 3 disease in higher volume\par noted he young and healthy so treatment best next step over continued AS\par reviewed definitive therapy in terms of RALP and various forms of radiation therapy - outcomes, risks benefits and complications of each reviewed.\par Leaning away from surgery - refer to Elizabeth,

## 2019-05-02 ENCOUNTER — OUTPATIENT (OUTPATIENT)
Dept: OUTPATIENT SERVICES | Facility: HOSPITAL | Age: 64
LOS: 1 days | Discharge: HOME | End: 2019-05-02
Payer: COMMERCIAL

## 2019-05-02 DIAGNOSIS — Z98.890 OTHER SPECIFIED POSTPROCEDURAL STATES: Chronic | ICD-10-CM

## 2019-05-07 ENCOUNTER — APPOINTMENT (OUTPATIENT)
Dept: RADIATION ONCOLOGY | Facility: CLINIC | Age: 64
End: 2019-05-07
Payer: COMMERCIAL

## 2019-05-07 VITALS
SYSTOLIC BLOOD PRESSURE: 136 MMHG | BODY MASS INDEX: 29.69 KG/M2 | RESPIRATION RATE: 17 BRPM | WEIGHT: 189.15 LBS | OXYGEN SATURATION: 98 % | DIASTOLIC BLOOD PRESSURE: 85 MMHG | TEMPERATURE: 98.2 F | HEART RATE: 90 BPM | HEIGHT: 67 IN

## 2019-05-07 PROCEDURE — 77263 THER RADIOLOGY TX PLNG CPLX: CPT

## 2019-05-07 PROCEDURE — 99205 OFFICE O/P NEW HI 60 MIN: CPT | Mod: 25

## 2019-05-08 NOTE — HISTORY OF PRESENT ILLNESS
[FreeTextEntry1] : Quique Boudreaux is a 63 years old male with Sewickley 4+3=7 adenocarcinoma of the prostate. He was referred by Dr. Saucedo who had been following patient for elevated PSA since 2017. His father has metastatic prostate cancer. Patient had travelled to the [facility] for a while thereby delayed his follow up with Dr. Saucedo.\par \par PSAs:\par 11/6/18   -  9.67\par 1/30/18   -  6.46\par 4/4/17     -  7.12\par \par Patient had MRI of prostate done on 11/25/18 which showed prostate volume of 36 cc. And right, posterior medial, midgland to apex peripheral zone lesion. PIRADS 3. No FRITZ, SVI, or pelvic lymphadenopathy was noted.\par \par He underwent prostate biopsy on 4/18/19, pathology report indicated 11 out of 15 cores was positive for cancer with Sewickley score 4+3=7 involving 5% - 70% of the tissue.\par \par Patient present here today for consideration for radiation therapy. Patient report one time nocturia at night. He denies dysuria, hematuria, urgency, hesitancy and frequency. He has regular daily bowel function. Patient is sexually active. He denies any groin or bone pain.

## 2019-05-08 NOTE — REVIEW OF SYSTEMS
[Nocturia] : nocturia [EPIC-CP Score (0-60): ___] : EPIC-CP score: [unfilled] [IPSS Score (0-40): ___] : IPSS score: [unfilled] [Negative] : Endocrine [Urinary Retention: Grade 0] : Urinary Retention: Grade 0 [Urinary Incontinence: Grade 0] : Urinary Incontinence: Grade 0  [Hematuria: Grade 0] : Hematuria: Grade 0 [Urinary Urgency: Grade 0] : Urinary Urgency: Grade 0 [Urinary Tract Pain: Grade 0] : Urinary Tract Pain: Grade 0 [Ejaculation Disorder: Grade 0] : Ejaculation Disorder: Grade 0 [Urinary Frequency: Grade 0] : Urinary Frequency: Grade 0 [Erectile Dysfunction: Grade 0] : Erectile Dysfunction: Grade 0 [Urinary Frequency] : no urinary frequency

## 2019-05-08 NOTE — DISEASE MANAGEMENT
[0-10] : 0 -10 ng/mL [7(4+3)] : Pily Score 7(4+3) [] : Patient had a Prostate MRI [3] : 3 [1] : T1 [c] : c [0] : M0 [BiopsyDate] : 4/18/19 [TotalCores] : 15 [TotalPositiveCores] : 11 [MaxCoreInvolvement] : 70% [IIC] : IIC

## 2019-06-12 NOTE — PROCEDURE
[FreeTextEntry1] : LUPRON INJECTION [FreeTextEntry3] :  ALAN GILBERT is a 63 yearS old male with Pily 4+3=7 adenocarcinoma of the prostate. Recommendation is 6 months of hormone therapy.\par \par He present here today for administration of  his first  Lupron injection. Lupron 22.5 mg IM  administered to the left gluteus area. Patient tolerated procedure well with no adverse effect. Patient education given and the risk and benefits of androgen deprivation therapy in the setting of high risk prostate cancer were reviewed including mood changes, loss of sexual function, weight gain, and hot flashes reviewed with patient. Second and last Lupron injection is due 9/12/19. Lupron injection was brought in by patient.\par \par Lot No:  [id number]\par Exp. date: 10/23/2021\par  [FreeTextEntry2] : AS PART OF TREATMENT FOR PROSTATE CANCER

## 2019-07-08 ENCOUNTER — OUTPATIENT (OUTPATIENT)
Dept: OUTPATIENT SERVICES | Facility: HOSPITAL | Age: 64
LOS: 1 days | End: 2019-07-08
Payer: COMMERCIAL

## 2019-07-08 VITALS
SYSTOLIC BLOOD PRESSURE: 132 MMHG | TEMPERATURE: 98 F | HEART RATE: 64 BPM | WEIGHT: 184.09 LBS | HEIGHT: 66 IN | OXYGEN SATURATION: 98 % | DIASTOLIC BLOOD PRESSURE: 88 MMHG | RESPIRATION RATE: 16 BRPM

## 2019-07-08 DIAGNOSIS — Z98.890 OTHER SPECIFIED POSTPROCEDURAL STATES: Chronic | ICD-10-CM

## 2019-07-08 DIAGNOSIS — R97.21 RISING PSA FOLLOWING TREATMENT FOR MALIGNANT NEOPLASM OF PROSTATE: ICD-10-CM

## 2019-07-08 DIAGNOSIS — C61 MALIGNANT NEOPLASM OF PROSTATE: ICD-10-CM

## 2019-07-08 DIAGNOSIS — Z01.818 ENCOUNTER FOR OTHER PREPROCEDURAL EXAMINATION: ICD-10-CM

## 2019-07-08 LAB
ANION GAP SERPL CALC-SCNC: 13 MMO/L — SIGNIFICANT CHANGE UP (ref 7–14)
BUN SERPL-MCNC: 19 MG/DL — SIGNIFICANT CHANGE UP (ref 7–23)
CALCIUM SERPL-MCNC: 9.9 MG/DL — SIGNIFICANT CHANGE UP (ref 8.4–10.5)
CHLORIDE SERPL-SCNC: 100 MMOL/L — SIGNIFICANT CHANGE UP (ref 98–107)
CO2 SERPL-SCNC: 25 MMOL/L — SIGNIFICANT CHANGE UP (ref 22–31)
CREAT SERPL-MCNC: 1.26 MG/DL — SIGNIFICANT CHANGE UP (ref 0.5–1.3)
GLUCOSE SERPL-MCNC: 83 MG/DL — SIGNIFICANT CHANGE UP (ref 70–99)
HCT VFR BLD CALC: 42.3 % — SIGNIFICANT CHANGE UP (ref 39–50)
HGB BLD-MCNC: 13.6 G/DL — SIGNIFICANT CHANGE UP (ref 13–17)
MCHC RBC-ENTMCNC: 30.9 PG — SIGNIFICANT CHANGE UP (ref 27–34)
MCHC RBC-ENTMCNC: 32.2 % — SIGNIFICANT CHANGE UP (ref 32–36)
MCV RBC AUTO: 96.1 FL — SIGNIFICANT CHANGE UP (ref 80–100)
NRBC # FLD: 0 K/UL — SIGNIFICANT CHANGE UP (ref 0–0)
PLATELET # BLD AUTO: 267 K/UL — SIGNIFICANT CHANGE UP (ref 150–400)
PMV BLD: 10.1 FL — SIGNIFICANT CHANGE UP (ref 7–13)
POTASSIUM SERPL-MCNC: 4.6 MMOL/L — SIGNIFICANT CHANGE UP (ref 3.5–5.3)
POTASSIUM SERPL-SCNC: 4.6 MMOL/L — SIGNIFICANT CHANGE UP (ref 3.5–5.3)
RBC # BLD: 4.4 M/UL — SIGNIFICANT CHANGE UP (ref 4.2–5.8)
RBC # FLD: 12.8 % — SIGNIFICANT CHANGE UP (ref 10.3–14.5)
SODIUM SERPL-SCNC: 138 MMOL/L — SIGNIFICANT CHANGE UP (ref 135–145)
WBC # BLD: 4.55 K/UL — SIGNIFICANT CHANGE UP (ref 3.8–10.5)
WBC # FLD AUTO: 4.55 K/UL — SIGNIFICANT CHANGE UP (ref 3.8–10.5)

## 2019-07-08 PROCEDURE — 93010 ELECTROCARDIOGRAM REPORT: CPT

## 2019-07-08 NOTE — H&P PST ADULT - NSICDXPASTMEDICALHX_GEN_ALL_CORE_FT
PAST MEDICAL HISTORY:  Elevated PSA     Malignant neoplasm of prostate     Seasonal allergies pollen

## 2019-07-08 NOTE — H&P PST ADULT - NSICDXPROBLEM_GEN_ALL_CORE_FT
PROBLEM DIAGNOSES  Problem: Malignant neoplasm of prostate  Assessment and Plan: Scheduled for surgery on 7/17/19  Preop instructions explained and written instructions reviewed, pt verbalized understanding with teach back, including [employer] instructions  Patient provided with Pepcid for GI prophylaxis verbalized understanding

## 2019-07-08 NOTE — H&P PST ADULT - NEGATIVE SKIN SYMPTOMS
"Patient: Kasie Ortiz [facility] #:  [id number]    Date of treatment: 11/21/2017   Time in: 9:05  Time out: 10:13  # Visits: 1/12  Auth: 12  Referral expiration: 12/31/17  POC expiration: 2/13/17    Outpatient Physical Therapy   Initial Evaluation    Kasie is a 48 y.o. female evaluated on 11/21/2017    Physician:  Darrian Calderon,*   Diagnosis:   Encounter Diagnoses   Name Primary?    Prolapse of anterior vaginal wall Yes    Posterior vaginal wall prolapse     Urinary urgency         Treatment ordered: PT    Medical History:   Past Medical History:   Diagnosis Date    Anxiety     Depression         Surgical History:   Past Surgical History:   Procedure Laterality Date    DILATION AND CURETTAGE OF UTERUS  2011        Medications:   Current Outpatient Prescriptions   Medication Sig    ALPRAZolam (XANAX) 0.25 MG tablet     buPROPion (WELLBUTRIN XL) 150 MG TB24 tablet     dexmethylphenidate (FOCALIN) 10 MG tablet Take 10 mg by mouth 3 (three) times daily.    escitalopram oxalate (LEXAPRO) 20 MG tablet Take 20 mg by mouth once daily.    multivitamin capsule Take 1 capsule by mouth once daily.     Current Facility-Administered Medications   Medication    levonorgestrel 20 mcg/24 hr (5 years) IUD 1 each       Allergies:   Review of patient's allergies indicates:   Allergen Reactions    Pcn [penicillins] Other (See Comments)     Unknown rx as child      Precautions: universal   OB/GYN History: see below  Bladder/Bowel History: denies       Barriers to Learning: none  Educational/Spiritual/Cultural needs: none  Environmental Barriers: none noted  Abuse/Neglect: no signs  Nutritional Status: well developed well nourished  Fall Risk: none    Subjective     Pt reports, "I guess my pelvic floor muscles are very loose." She states she had a bad delivery, with foreceps and a strong epidural, "she was yanked out" and then did not fully deliver the placenta. This was in September 2001. She had a second baby " in sept 2004. This one was better. Had an episiotomy the first time not the second time. Largest birth weight < 8 lbs.    Pain: Patient reports 0/10 with 0 being the lowest and 10 being the highest. Pt states sometimes she has pelvic pressure periodically, not concerning.    Pain or lack of sensation with vaginal/pelvic exam? denies  Sexually active? yes  Contraception? Hormones? Just had an IUD put in so is kind of spotting, not sure when her period is coming; had a copper IUD for 10 years, now on Mirena    Regular periods? Just started Mirena    Previous treatment included: none    Frequency of Urination: Daytime: 7        Nighttime: 0-2     Urine Stream: splayed    Bladder Leakage: Yes  Frequency of incidents: a few times/week, with laughing, jumping, with strong urge on the way to the toilet, during intercourse  Amount Leaked: drops    Do you have difficulty initiating your urine stream? no  Do you feel like you are emptying completely? no    Frequency of Bowel Movements: 1-2 times/day    Do you have difficulty initiating a bowel movement? Yes, admits to straining, has a hemorrhoid from child birth  Colon leakage: yes, used to have diarrhea and would leak with fart/not getting to the toilet in time, 1 year ago, has improved with improvements in diet  Stool consistency? 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 Lives with  and children     Patient's Goals: to not feel so loose during intimacy     Objective     ORTHO SCREEN  Posture: WNL    Pelvic Alignment: deviations: L ASIS elevated    ABDOMINALS  Scarring: none      Diastasis: none   Abdominal strength: Rectus: WFL     Transverse:  palpable       VAGINAL PELVIC FLOOR EXAM  EXTERNAL ASSESSMENT  Skin Condition: WNL  Scarring: episiotomy scar noted  Sensation: WNL  Pain: none  Introitus: WNL   Voluntary Contraction: visible lift  Voluntary Relaxation: drop  Involuntary Contraction: bulge  Bearing down: present, anterior vaginal wall weakness > posterior vaginal wall      INTERNAL ASSESSMENT  Pain: none  Sensation: able to distinguish pressure from side to side, and able to feel the difference between lift and drop; though pt states she does not feel deep pressure to OI/levators   Atrophy noted to superficial PFM  Vaginal Vault: WNL  Muscle Bulk: atrophy  Muscle Power: 2/5  Muscle Endurance: 10 sec x 1 rep  # Reps To Fatigue: NA    Fast Contractions: NA    Quality of Contraction: slow rise, decreased hold and slow relaxation  Specificity: WNL   Coordination: requires much focus to perform  Prolapse Check: Stage 2 anterior and posterior vaginal wall    TREATMENT    Pt received individual neuromuscular reeducation for 25 minutes including:    - Diaphragmatic breathing with verbal and tactile cueing  - TA activation with verbal/tactile cueing  - Pt education     Education: Instructed on anatomy/physiology of urinary/bowel system and PF function using pelvic model; discussed plan of care with patient; instructed in purpose of physical therapy and the  benefits/risks of treatment; instructed in risks of refusing treatment. Patient agreed to treatment plan. Pt was instructed in HEP including diaphragmatic breathing and TA set; pt demonstrated and verbalized understanding and was provided with a handout. Discussed need to sit down to urinate (as opposed to squatting while in public) and relaxing; instructed in double void techniques.    Assessment     Kasie is a 48 y.o. female referred to outpatient physical therapy with a medical diagnosis of urinary urgency, vaginal wall prolapse, and incomplete emptying. Pt presents today with signs and symptoms  consistent with referring diagnosis including decreased vaginal and PFM sensation, PFM weakness, decreased endurance, and decreased coordination. Pt also with history of hovering, possible contribution of poor fluid intake, and straining. Pt will benefit from physcial therapy services in order to maximize pain free functional independence. The following goals were discussed with the patient and patient is in agreement with them as to be addressed in the treatment plan. Pt was given a HEP as described above. Pt verbally understood the instructions as they were given and demonstrated proper form and technique during therapy. Pt was advise to perform these exercises free of pain and to stop performing them if pain occurs.   Will administer diary and questionnaire at next session, progress PFM exercises.    Prognosis: good  No educational, cultural, or spiritual needs identified.    History  Co-morbidities and personal factors that may impact the plan of care Examination  Body Structures and Functions, activity limitations and participation restrictions that may impact the plan of care    Clinical Presentation   Co-morbidities:   Traumatic delivery  Hx straining with hemorrhoid        Personal Factors:   none Body Regions:   Pelvis, trunk    Body Systems:    Musculoskeletal, neuromuscular            Participation Restrictions:   denies     Activity limitations:   Learning and applying knowledge  no deficits    General Tasks and Commands  no deficits    Communication  no deficits    Mobility  no deficits    Self care  no deficits    Domestic Life  no deficits    Interactions/Relationships  no deficits    Life Areas  no deficits    Community and Social Life  no deficits         stable and uncomplicated                      low   low  moderate Decision Making/ Complexity Score:  low       GOALS  Short Term Goals: 6 weeks (1/2/17)  1. Pt will verbalize improved awareness of PFM activity as palpated by PT in order to improve  activity involvement with HEP.  2. Pt will be able to perform 10 x 5'' kegals in supine in order to decrease progression of prolapse and improve central stabilization.  3. Pt will be able to perform kegals with her breath in order to improve integration and function of her deep core to prevent future problems.  4. Pt will tolerate HEP to improve impairments and independence with ADL's.    Long Term Goals: 12 weeks (2/13/17)  1. Pt will increase PFM strength to 3/5 in order to prevent progression of prolapse and improve core strength and stability.  2. Pt will be able to perform 10 x 10'' kegals in sitting in order to progress towards self management.  3. Pt will be independent with HEP and self management.    Plan     Pt will be treated by physical therapy 1 time a week for 3 months for Pt Education, HEP, therapeutic exercises, neuromuscular re-education, therapeutic activity, gait training, manual therapy, and modalities (including SEMG) PRN to achieve established goals.    no itching/no dryness/no rash

## 2019-07-08 NOTE — H&P PST ADULT - HISTORY OF PRESENT ILLNESS
63 year old male with history of abnormal PSA levels, reports being a  to 9/11/2001 IT MOVES IT. Pt presents today for presurgical evaluation for transperineal placement of spaceoar gel and markers on 7/17/19.

## 2019-07-11 PROCEDURE — 77470 SPECIAL RADIATION TREATMENT: CPT | Mod: 26

## 2019-07-12 NOTE — DISCUSSION/SUMMARY
[Patient seen for WTC Monitoring ___] : Patient was seen for WTC monitoring [unfilled] [Please See Note in Chart and Documentation in Trial DB] : Please see note in chart and documentation in Trial DB. [FreeTextEntry3] : Occupation: Health system  - retired \par Dates: 09/11/2001 present at the [facility] collapse, \par Searching on 09/11 on the pile for the first 3 days. 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 \par \par Spirometry: Reviewed. normal\par \par A/P : WTC monitoring visit \par See Additional note for todays date/ WTC treatment CBC, CMP, lipids, UA ordered

## 2019-07-17 PROCEDURE — 55874 TPRNL PLMT BIODEGRDABL MATRL: CPT | Mod: 26

## 2019-07-17 PROCEDURE — 55876 PLACE RT DEVICE/MARKER PROS: CPT

## 2019-07-17 PROCEDURE — 76872 US TRANSRECTAL: CPT | Mod: 26

## 2019-07-17 NOTE — PROCEDURE
[FreeTextEntry1] : Transperineal placement of spaceoar gel and markers [FreeTextEntry2] : IN PREPARATION FOR RADIATION THERAPY FOR PROSTATE CANCER TREATMENT [FreeTextEntry3] : Quique Boudreaux is a 63 years old male with Pily 4+3=7 adenocarcinoma of the prostate. He present here today for transperineal placement of spaceoar gel and markers in preparation for radiation therapy for his prostate cancer treatment.\par \par Procedure Note: In preparation for the procedure, he self-administered an enema one hour before leaving home and was NPO the night before procedure. Procedure risk and benefits were reviewed with patient and a written consent was obtained prior to procedure.  A time out was observed with patient name, date of birth, procedure, position, and site verified. Anesthesia was provided by the Anesthesia Department.\par \par Patient was placed in a lithotomy position. Povidone-iodine was used to prep the skin. While maintaining aseptic technique an ultrasound probe was inserted into the rectum to visualize the prostate. Lidocaine 2% was infused into the perineal area.  Three fiducial markers were prepared on the sterile field. One fiducial marker was placed into each of the following sites: left lobe, right lobe and apex via 14 gauge needles under ultrasound guidance.    \par \par Next, the hydrogel spacer kit was opened onto the sterile field and the hydrogel injection apparatus was prepared. An 18 gauge needle was positioned into the mid-line perirectal fat between the anterior rectal wall and prostate under ultrasound guidance. Less than 10 cc of saline was injected via the needle to hydrodissect the space and confirm proper placement in both axial and sagittal views. The syringe was aspirated to confirm the needle was extravascular.  The syringe was replaced with the hydrogel injection apparatus and the gel was injected over about 10 seconds. The needle was then removed.  There was minimal blood loss. The patient tolerated procedure well.\par \par Patient was transferred to the recovery area on a monitor. Vital signs were stable. He tolerated fluid and a snack by mouth and was made comfortable. He denied pain. Post procedure instruction were given and reviewed with patient. CT/SIM and MRI appointment was given for 7/29/19 at 10 am. He was discharged home in a stable condition, accompanied by his sister.\par \par \par \par

## 2019-07-17 NOTE — REASON FOR VISIT
[Family Member] : family member [FreeTextEntry2] : transperineal placement of spaceoar gel and markers

## 2019-07-24 ENCOUNTER — LABORATORY RESULT (OUTPATIENT)
Age: 64
End: 2019-07-24

## 2019-07-25 LAB
APPEARANCE: CLEAR
BILIRUBIN URINE: NEGATIVE
BLOOD URINE: ABNORMAL
COLOR: YELLOW
GLUCOSE QUALITATIVE U: NORMAL
KETONES URINE: NEGATIVE
LEUKOCYTE ESTERASE URINE: NEGATIVE
NITRITE URINE: NEGATIVE
PH URINE: 5.5
PROTEIN URINE: NORMAL
SPECIFIC GRAVITY URINE: 1.03
UROBILINOGEN URINE: NORMAL

## 2019-07-29 ENCOUNTER — OUTPATIENT (OUTPATIENT)
Dept: OUTPATIENT SERVICES | Facility: HOSPITAL | Age: 64
LOS: 1 days | End: 2019-07-29

## 2019-07-29 ENCOUNTER — APPOINTMENT (OUTPATIENT)
Dept: MRI IMAGING | Facility: IMAGING CENTER | Age: 64
End: 2019-07-29

## 2019-07-29 ENCOUNTER — FORM ENCOUNTER (OUTPATIENT)
Age: 64
End: 2019-07-29

## 2019-07-29 DIAGNOSIS — C61 MALIGNANT NEOPLASM OF PROSTATE: ICD-10-CM

## 2019-07-29 DIAGNOSIS — Z98.890 OTHER SPECIFIED POSTPROCEDURAL STATES: Chronic | ICD-10-CM

## 2019-07-29 PROBLEM — J30.2 OTHER SEASONAL ALLERGIC RHINITIS: Chronic | Status: ACTIVE | Noted: 2017-06-06

## 2019-07-30 PROCEDURE — 77301 RADIOTHERAPY DOSE PLAN IMRT: CPT | Mod: 26

## 2019-07-30 PROCEDURE — 77300 RADIATION THERAPY DOSE PLAN: CPT | Mod: 26

## 2019-07-30 PROCEDURE — 77338 DESIGN MLC DEVICE FOR IMRT: CPT | Mod: 26

## 2019-08-09 PROCEDURE — 77387B: CUSTOM | Mod: 26

## 2019-08-09 PROCEDURE — 77427 RADIATION TX MANAGEMENT X5: CPT

## 2019-08-12 ENCOUNTER — OTHER (OUTPATIENT)
Age: 64
End: 2019-08-12

## 2019-08-12 PROCEDURE — 77387B: CUSTOM | Mod: 26

## 2019-08-12 NOTE — HISTORY OF PRESENT ILLNESS
[FreeTextEntry1] : Quique Boudreaux is a 63 years old male with Pily 4+3=7 adenocarcinoma of the prostate. He presents here today for radiation treatment. He report fatigue due to the Lupron injection. He denies any urinary or bowel issues. Completed 360/4500 cGy today.

## 2019-08-13 PROCEDURE — 77387B: CUSTOM | Mod: 26

## 2019-08-14 PROCEDURE — 77387B: CUSTOM | Mod: 26

## 2019-08-15 PROCEDURE — 77387B: CUSTOM | Mod: 26

## 2019-08-16 PROCEDURE — 77427 RADIATION TX MANAGEMENT X5: CPT

## 2019-08-16 PROCEDURE — 77387B: CUSTOM | Mod: 26

## 2019-08-19 PROCEDURE — 77387B: CUSTOM | Mod: 26

## 2019-08-19 NOTE — HISTORY OF PRESENT ILLNESS
[FreeTextEntry1] : Quique Boudreaux is a 63 years old male with Pily 4+3=7 adenocarcinoma of the prostate. He presents here today for radiation treatment. He report fatigue due to the Lupron injection. He report urinary hesitancy and denies any other urinary or bowel issues.. Completed  1260/4500 cGy today.

## 2019-08-20 PROCEDURE — 77387B: CUSTOM | Mod: 26

## 2019-08-21 PROCEDURE — 77387B: CUSTOM | Mod: 26

## 2019-08-22 PROCEDURE — 77387B: CUSTOM | Mod: 26

## 2019-08-23 PROCEDURE — 77427 RADIATION TX MANAGEMENT X5: CPT

## 2019-08-23 PROCEDURE — 77387B: CUSTOM | Mod: 26

## 2019-08-26 PROCEDURE — 77387B: CUSTOM | Mod: 26

## 2019-08-26 NOTE — VITALS
[Maximal Pain Intensity: 0/10] : 0/10 [90: Able to carry normal activity; minor signs or symptoms of disease.] : 90: Able to carry normal activity; minor signs or symptoms of disease.  [Least Pain Intensity: 0/10] : 0/10 [ECOG Performance Status: 1 - Restricted in physically strenuous activity but ambulatory and able to carry out work of a light or sedentary nature] : Performance Status: 1 - Restricted in physically strenuous activity but ambulatory and able to carry out work of a light or sedentary nature, e.g., light house work, office work

## 2019-08-26 NOTE — HISTORY OF PRESENT ILLNESS
[FreeTextEntry1] : Quique Boudreaux is a 63 years old male with Pily 4+3=7 adenocarcinoma of the prostate. He presents here today for radiation treatment. He report fatigue due to the Lupron injection. He denies any urinary or bowel issues.. Completed  2160/4500 cGy today.

## 2019-08-27 PROCEDURE — 77387B: CUSTOM | Mod: 26

## 2019-08-28 PROCEDURE — 77387B: CUSTOM | Mod: 26

## 2019-08-29 PROCEDURE — 77387B: CUSTOM | Mod: 26

## 2019-08-30 PROCEDURE — 77387B: CUSTOM | Mod: 26

## 2019-09-03 PROCEDURE — 77387B: CUSTOM | Mod: 26

## 2019-09-03 NOTE — HISTORY OF PRESENT ILLNESS
[FreeTextEntry1] : Quique Boudreaux is a 63 years old male with Pily 4+3=7 adenocarcinoma of the prostate. He presents here today for radiation treatment. He report fatigue due to the Lupron injection. He denies any urinary or bowel issues.. Completed  3060/4500 cGy today.

## 2019-09-04 PROCEDURE — 77387B: CUSTOM | Mod: 26

## 2019-09-04 PROCEDURE — 77427 RADIATION TX MANAGEMENT X5: CPT

## 2019-09-05 PROCEDURE — 77387B: CUSTOM | Mod: 26

## 2019-09-06 PROCEDURE — 77387B: CUSTOM | Mod: 26

## 2019-09-09 ENCOUNTER — RX RENEWAL (OUTPATIENT)
Age: 64
End: 2019-09-09

## 2019-09-09 PROCEDURE — 77387B: CUSTOM | Mod: 26

## 2019-09-10 PROCEDURE — 77387B: CUSTOM | Mod: 26

## 2019-09-11 PROCEDURE — 77387B: CUSTOM | Mod: 26

## 2019-09-12 PROCEDURE — 77387B: CUSTOM | Mod: 26

## 2019-09-13 PROCEDURE — 77387B: CUSTOM | Mod: 26

## 2019-09-14 NOTE — HISTORY OF PRESENT ILLNESS
[FreeTextEntry1] : Mr. Saunders is a 63 years old male with Pily 4+3=7 adenocarcinoma of the prostate. He is receiving EBRT and brachytherapy boost with concurrent ADT. \par \par He presents here today for radiation treatment. He reports fatigue, headaches, not being able to sleep and also feeling unusual, sometimes paranoid. He noted slight burning sensation with urination. Currently taking Flomax daily.

## 2019-09-14 NOTE — REVIEW OF SYSTEMS
[Constipation: Grade 0] : Constipation: Grade 0 [Diarrhea: Grade 0] : Diarrhea: Grade 0 [Hematuria: Grade 0] : Hematuria: Grade 0 [Urinary Incontinence: Grade 0] : Urinary Incontinence: Grade 0  [Urinary Retention: Grade 2 - Placement of urinary, suprapubic or intermittent catheter placement indicated; medication indicated] : Urinary Retention: Grade 2 - Placement of urinary, suprapubic or intermittent catheter placement indicated; medication indicated [Urinary Tract Pain: Grade 0] : Urinary Tract Pain: Grade 0 [Urinary Urgency: Grade 1 - Present] : Urinary Urgency: Grade 1 - Present [Urinary Frequency: Grade 2 - Limiting instrumental ADL; medical management indicated] : Urinary Frequency: Grade 2 - Limiting instrumental ADL; medical management indicated

## 2019-09-14 NOTE — DISEASE MANAGEMENT
[Clinical] : TNM Stage: c [IIC] : IIC [TTNM] : 1c [NTNM] : 0 [MTNM] : 0 [de-identified] : 4410 cGy [de-identified] : 4500 cGy [de-identified] : Prostate

## 2019-09-14 NOTE — VITALS
[Maximal Pain Intensity: 0/10] : 0/10 [Least Pain Intensity: 0/10] : 0/10 [ECOG Performance Status: 1 - Restricted in physically strenuous activity but ambulatory and able to carry out work of a light or sedentary nature] : Performance Status: 1 - Restricted in physically strenuous activity but ambulatory and able to carry out work of a light or sedentary nature, e.g., light house work, office work [80: Normal activity with effort; some signs or symptoms of disease.] : 80: Normal activity with effort; some signs or symptoms of disease.

## 2019-10-10 ENCOUNTER — OUTPATIENT (OUTPATIENT)
Dept: OUTPATIENT SERVICES | Facility: HOSPITAL | Age: 64
LOS: 1 days | End: 2019-10-10

## 2019-10-10 VITALS
SYSTOLIC BLOOD PRESSURE: 116 MMHG | RESPIRATION RATE: 16 BRPM | DIASTOLIC BLOOD PRESSURE: 78 MMHG | OXYGEN SATURATION: 96 % | HEART RATE: 78 BPM | HEIGHT: 67 IN | WEIGHT: 190.92 LBS | TEMPERATURE: 98 F

## 2019-10-10 DIAGNOSIS — Z98.890 OTHER SPECIFIED POSTPROCEDURAL STATES: Chronic | ICD-10-CM

## 2019-10-10 DIAGNOSIS — Z92.3 PERSONAL HISTORY OF IRRADIATION: Chronic | ICD-10-CM

## 2019-10-10 DIAGNOSIS — C61 MALIGNANT NEOPLASM OF PROSTATE: ICD-10-CM

## 2019-10-10 LAB
ANION GAP SERPL CALC-SCNC: 13 MMO/L — SIGNIFICANT CHANGE UP (ref 7–14)
BUN SERPL-MCNC: 19 MG/DL — SIGNIFICANT CHANGE UP (ref 7–23)
CALCIUM SERPL-MCNC: 10.1 MG/DL — SIGNIFICANT CHANGE UP (ref 8.4–10.5)
CHLORIDE SERPL-SCNC: 100 MMOL/L — SIGNIFICANT CHANGE UP (ref 98–107)
CO2 SERPL-SCNC: 25 MMOL/L — SIGNIFICANT CHANGE UP (ref 22–31)
CREAT SERPL-MCNC: 1.16 MG/DL — SIGNIFICANT CHANGE UP (ref 0.5–1.3)
GLUCOSE SERPL-MCNC: 95 MG/DL — SIGNIFICANT CHANGE UP (ref 70–99)
HCT VFR BLD CALC: 36.5 % — LOW (ref 39–50)
HGB BLD-MCNC: 11.9 G/DL — LOW (ref 13–17)
MCHC RBC-ENTMCNC: 31.1 PG — SIGNIFICANT CHANGE UP (ref 27–34)
MCHC RBC-ENTMCNC: 32.6 % — SIGNIFICANT CHANGE UP (ref 32–36)
MCV RBC AUTO: 95.3 FL — SIGNIFICANT CHANGE UP (ref 80–100)
NRBC # FLD: 0 K/UL — SIGNIFICANT CHANGE UP (ref 0–0)
PLATELET # BLD AUTO: 275 K/UL — SIGNIFICANT CHANGE UP (ref 150–400)
PMV BLD: 9.9 FL — SIGNIFICANT CHANGE UP (ref 7–13)
POTASSIUM SERPL-MCNC: 4.6 MMOL/L — SIGNIFICANT CHANGE UP (ref 3.5–5.3)
POTASSIUM SERPL-SCNC: 4.6 MMOL/L — SIGNIFICANT CHANGE UP (ref 3.5–5.3)
RBC # BLD: 3.83 M/UL — LOW (ref 4.2–5.8)
RBC # FLD: 13.9 % — SIGNIFICANT CHANGE UP (ref 10.3–14.5)
SODIUM SERPL-SCNC: 138 MMOL/L — SIGNIFICANT CHANGE UP (ref 135–145)
WBC # BLD: 3.86 K/UL — SIGNIFICANT CHANGE UP (ref 3.8–10.5)
WBC # FLD AUTO: 3.86 K/UL — SIGNIFICANT CHANGE UP (ref 3.8–10.5)

## 2019-10-10 RX ORDER — SODIUM CHLORIDE 9 MG/ML
3 INJECTION INTRAMUSCULAR; INTRAVENOUS; SUBCUTANEOUS ONCE
Refills: 0 | Status: DISCONTINUED | OUTPATIENT
Start: 2019-10-16 | End: 2019-11-04

## 2019-10-10 RX ORDER — SODIUM CHLORIDE 9 MG/ML
1000 INJECTION, SOLUTION INTRAVENOUS
Refills: 0 | Status: DISCONTINUED | OUTPATIENT
Start: 2019-10-16 | End: 2019-11-04

## 2019-10-10 RX ORDER — ASCORBIC ACID 60 MG
1000 TABLET,CHEWABLE ORAL
Qty: 0 | Refills: 0 | DISCHARGE

## 2019-10-10 NOTE — H&P PST ADULT - HISTORY OF PRESENT ILLNESS
65 y/o male with malignant neoplasm of prostate presents to [facility] for preoperative evaluation. Scheduled for Insertion of Radioactive Seeds in Prostate on 10/16/2019. Pt completed 5 weeks of radiation therapy, completed 9/13/2019. 65 y/o male with malignant neoplasm of prostate presents to [facility] for preoperative evaluation. Scheduled for Insertion of Radioactive Seeds in Prostate on 10/16/2019. Pt s/p 5 weeks of radiation therapy, completed 9/13/2019.

## 2019-10-10 NOTE — H&P PST ADULT - RS GEN PE MLT RESP DETAILS PC
no wheezes/respirations non-labored/clear to auscultation bilaterally/breath sounds equal/airway patent/good air movement/no chest wall tenderness

## 2019-10-10 NOTE — H&P PST ADULT - NSICDXPASTSURGICALHX_GEN_ALL_CORE_FT
PAST SURGICAL HISTORY:  H/O knee surgery left meniscus repair in 2004    H/O prostate biopsy     History of prostate surgery s/p spaceoar get and marker procedure July 2019    S/P radiation therapy

## 2019-10-10 NOTE — H&P PST ADULT - NSICDXPROBLEM_GEN_ALL_CORE_FT
PROBLEM DIAGNOSES  Problem: Malignant neoplasm prostate  Assessment and Plan: Scheduled for Insertion of Radioactive Seeds in Prostate on 10/16/2019.   Preop instructions given, pt verbalized understanding   GI prophylaxis provided  Labs pending

## 2019-10-15 ENCOUNTER — TRANSCRIPTION ENCOUNTER (OUTPATIENT)
Age: 64
End: 2019-10-15

## 2019-10-15 PROCEDURE — 77316 BRACHYTX ISODOSE PLAN SIMPLE: CPT | Mod: 26

## 2019-10-16 ENCOUNTER — OUTPATIENT (OUTPATIENT)
Dept: OUTPATIENT SERVICES | Facility: HOSPITAL | Age: 64
LOS: 1 days | Discharge: HOME | End: 2019-10-16
Payer: COMMERCIAL

## 2019-10-16 VITALS
HEIGHT: 67 IN | OXYGEN SATURATION: 100 % | RESPIRATION RATE: 16 BRPM | HEART RATE: 81 BPM | DIASTOLIC BLOOD PRESSURE: 67 MMHG | SYSTOLIC BLOOD PRESSURE: 111 MMHG | WEIGHT: 190.92 LBS | TEMPERATURE: 97 F

## 2019-10-16 VITALS
SYSTOLIC BLOOD PRESSURE: 124 MMHG | OXYGEN SATURATION: 99 % | HEART RATE: 82 BPM | RESPIRATION RATE: 15 BRPM | TEMPERATURE: 98 F | DIASTOLIC BLOOD PRESSURE: 72 MMHG

## 2019-10-16 DIAGNOSIS — Z98.890 OTHER SPECIFIED POSTPROCEDURAL STATES: Chronic | ICD-10-CM

## 2019-10-16 DIAGNOSIS — C61 MALIGNANT NEOPLASM OF PROSTATE: ICD-10-CM

## 2019-10-16 DIAGNOSIS — Z92.3 PERSONAL HISTORY OF IRRADIATION: Chronic | ICD-10-CM

## 2019-10-16 PROCEDURE — 76965 ECHO GUIDANCE RADIOTHERAPY: CPT | Mod: 26

## 2019-10-16 PROCEDURE — 77332 RADIATION TREATMENT AID(S): CPT | Mod: 26

## 2019-10-16 PROCEDURE — 77778 APPLY INTERSTIT RADIAT COMPL: CPT | Mod: 26

## 2019-10-16 PROCEDURE — 77295 3-D RADIOTHERAPY PLAN: CPT | Mod: 26

## 2019-10-16 PROCEDURE — 55875 TRANSPERI NEEDLE PLACE PROS: CPT

## 2019-10-16 PROCEDURE — 77331 SPECIAL RADIATION DOSIMETRY: CPT | Mod: 26

## 2019-10-16 RX ORDER — SODIUM CHLORIDE 9 MG/ML
1000 INJECTION, SOLUTION INTRAVENOUS
Refills: 0 | Status: DISCONTINUED | OUTPATIENT
Start: 2019-10-16 | End: 2019-11-04

## 2019-10-16 NOTE — ASU PREOP CHECKLIST - NOTHING BY MOUTH SINCE
Patient arrived to the ER due to concern in regards to her pacemaker monitor alarming at home earlier in the afternoon on 09/02/2017. Upon arrival to the ER, patient is currently denying any chest pain or SOB at this time. An ECG has been taken and MD is aware of the situation. Writer will continue to monitor.   15-Oct-2019 23:00

## 2019-10-30 ENCOUNTER — FORM ENCOUNTER (OUTPATIENT)
Age: 64
End: 2019-10-30

## 2019-11-04 ENCOUNTER — LABORATORY RESULT (OUTPATIENT)
Age: 64
End: 2019-11-04

## 2019-11-04 DIAGNOSIS — R30.0 DYSURIA: ICD-10-CM

## 2019-11-05 ENCOUNTER — FORM ENCOUNTER (OUTPATIENT)
Age: 64
End: 2019-11-05

## 2019-11-06 ENCOUNTER — APPOINTMENT (OUTPATIENT)
Dept: RADIOLOGY | Facility: IMAGING CENTER | Age: 64
End: 2019-11-06
Payer: COMMERCIAL

## 2019-11-06 ENCOUNTER — OUTPATIENT (OUTPATIENT)
Dept: OUTPATIENT SERVICES | Facility: HOSPITAL | Age: 64
LOS: 1 days | End: 2019-11-06
Payer: COMMERCIAL

## 2019-11-06 DIAGNOSIS — Z92.3 PERSONAL HISTORY OF IRRADIATION: Chronic | ICD-10-CM

## 2019-11-06 DIAGNOSIS — Z98.890 OTHER SPECIFIED POSTPROCEDURAL STATES: Chronic | ICD-10-CM

## 2019-11-06 DIAGNOSIS — C61 MALIGNANT NEOPLASM OF PROSTATE: ICD-10-CM

## 2019-11-06 PROCEDURE — 77290 THER RAD SIMULAJ FIELD CPLX: CPT | Mod: 26

## 2019-11-06 PROCEDURE — 77333 RADIATION TREATMENT AID(S): CPT | Mod: 26

## 2019-11-06 PROCEDURE — 71045 X-RAY EXAM CHEST 1 VIEW: CPT | Mod: 26

## 2019-11-06 PROCEDURE — 71045 X-RAY EXAM CHEST 1 VIEW: CPT

## 2019-11-06 PROCEDURE — 72170 X-RAY EXAM OF PELVIS: CPT | Mod: 26

## 2019-11-06 PROCEDURE — 72170 X-RAY EXAM OF PELVIS: CPT

## 2019-11-13 LAB
APPEARANCE: ABNORMAL
BILIRUBIN URINE: NEGATIVE
BLOOD URINE: NEGATIVE
COLOR: YELLOW
GLUCOSE QUALITATIVE U: NEGATIVE
KETONES URINE: NORMAL
LEUKOCYTE ESTERASE URINE: NEGATIVE
NITRITE URINE: NEGATIVE
PH URINE: 6
PROTEIN URINE: NORMAL
SPECIFIC GRAVITY URINE: 1.03
UROBILINOGEN URINE: NORMAL

## 2019-11-19 PROCEDURE — 77295 3-D RADIOTHERAPY PLAN: CPT | Mod: 26

## 2019-11-21 ENCOUNTER — APPOINTMENT (OUTPATIENT)
Dept: RADIATION ONCOLOGY | Facility: CLINIC | Age: 64
End: 2019-11-21
Payer: COMMERCIAL

## 2019-11-21 VITALS
OXYGEN SATURATION: 100 % | WEIGHT: 197.09 LBS | RESPIRATION RATE: 17 BRPM | HEART RATE: 83 BPM | TEMPERATURE: 98.1 F | BODY MASS INDEX: 30.93 KG/M2 | HEIGHT: 67 IN | DIASTOLIC BLOOD PRESSURE: 81 MMHG | SYSTOLIC BLOOD PRESSURE: 117 MMHG

## 2019-11-21 PROCEDURE — 99024 POSTOP FOLLOW-UP VISIT: CPT

## 2019-11-21 NOTE — DISEASE MANAGEMENT
[1] : T1 [c] : c [0] : N0 [0-10] : 0 -10 ng/mL [7(4+3)] : Pily Score 7(4+3) [] : Patient had a Prostate MRI [3] : 3 [IIC] : IIC [Radiation Therapy] : Radiation Therapy [Treatment with radiation therapy] : Treatment with radiation therapy [EBRT] : EBRT [LDR] : LDR [Treatment with androgen ablation] : Treatment with androgen ablation [BiopsyDate] : 4/18/19 [TotalCores] : 15 [TotalPositiveCores] : 11 [MaxCoreInvolvement] : 70% [RadiationCompletedDate] : 10/16/19 [EBRTDose] : 3969 [EBRTFractions] : 25 [LDRDose] : 100

## 2019-11-21 NOTE — HISTORY OF PRESENT ILLNESS
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 He elected combination therapy of external beam radiation therapy to a dose of 4500 cGy between 8/9/19 - 9/13/19. He underwent a radioactive seed implant of the prostate on 10/16/19 dose 100 cGy after completion of EBRT. Patient tolerated radiation treatment well without developing any grade 3 or higher acute toxicity as a result of his treatment.He did develop some urinary bother for which Flomax was prescribed.\par \par Patient presents here today for post treatment follow up. He endorsed nocturia 3-4 times per night, frequency and weak urinary flow. He denies hematuria, and urgency. Has regular daily bowel function and he is sexually active.

## 2019-11-21 NOTE — REVIEW OF SYSTEMS
[Nocturia] : nocturia [Urinary Frequency] : urinary frequency [IPSS Score (0-40): ___] : IPSS score: [unfilled] [EPIC-CP Score (0-60): ___] : EPIC-CP score: [unfilled] [Negative] : Endocrine [Hematuria: Grade 0] : Hematuria: Grade 0 [Urinary Incontinence: Grade 0] : Urinary Incontinence: Grade 0  [Urinary Retention: Grade 0] : Urinary Retention: Grade 0 [Urinary Tract Pain: Grade 1 - Mild pain] : Urinary Tract Pain: Grade 1 - Mild pain [Urinary Urgency: Grade 0] : Urinary Urgency: Grade 0 [Urinary Frequency: Grade 1 - Present] : Urinary Frequency: Grade 1 - Present [Ejaculation Disorder: Grade 0] : Ejaculation Disorder: Grade 0 [Erectile Dysfunction: Grade 0] : Erectile Dysfunction: Grade 0

## 2019-12-02 RX ORDER — TAMSULOSIN HYDROCHLORIDE 0.4 MG/1
0.4 CAPSULE ORAL
Qty: 60 | Refills: 2 | Status: COMPLETED | COMMUNITY
Start: 2019-08-19 | End: 2019-12-02

## 2019-12-02 RX ORDER — CIPROFLOXACIN HYDROCHLORIDE 500 MG/1
500 TABLET, FILM COATED ORAL
Qty: 10 | Refills: 0 | Status: COMPLETED | COMMUNITY
Start: 2019-11-06 | End: 2019-12-02

## 2019-12-10 LAB — PSA SERPL-MCNC: 1.33 NG/ML

## 2020-03-24 ENCOUNTER — RX RENEWAL (OUTPATIENT)
Age: 65
End: 2020-03-24

## 2020-04-10 ENCOUNTER — APPOINTMENT (OUTPATIENT)
Dept: OTHER | Facility: CLINIC | Age: 65
End: 2020-04-10
Payer: COMMERCIAL

## 2020-04-10 PROCEDURE — 99396 PREV VISIT EST AGE 40-64: CPT

## 2020-04-10 PROCEDURE — 99442: CPT

## 2020-04-10 RX ORDER — PHENAZOPYRIDINE 100 MG/1
100 TABLET, FILM COATED ORAL 3 TIMES DAILY
Qty: 30 | Refills: 3 | Status: DISCONTINUED | COMMUNITY
Start: 2019-11-04 | End: 2020-04-10

## 2020-04-10 RX ORDER — LEUPROLIDE ACETATE 22.5 MG
22.5 KIT INTRAMUSCULAR
Qty: 1 | Refills: 0 | Status: DISCONTINUED | COMMUNITY
Start: 2019-08-22 | End: 2020-04-10

## 2020-04-10 RX ORDER — CIPROFLOXACIN HYDROCHLORIDE 500 MG/1
500 TABLET, FILM COATED ORAL
Qty: 14 | Refills: 0 | Status: DISCONTINUED | COMMUNITY
Start: 2019-07-23 | End: 2020-04-10

## 2020-04-10 RX ORDER — TERAZOSIN 2 MG/1
2 CAPSULE ORAL
Qty: 30 | Refills: 3 | Status: DISCONTINUED | COMMUNITY
Start: 2019-10-29 | End: 2020-04-10

## 2020-04-10 RX ORDER — CETIRIZINE HYDROCHLORIDE 10 MG/1
10 TABLET, FILM COATED ORAL
Qty: 30 | Refills: 3 | Status: DISCONTINUED | COMMUNITY
Start: 2019-04-11 | End: 2020-04-10

## 2020-04-10 RX ORDER — CEPHALEXIN 500 MG/1
500 CAPSULE ORAL TWICE DAILY
Qty: 14 | Refills: 0 | Status: DISCONTINUED | COMMUNITY
Start: 2019-07-24 | End: 2020-04-10

## 2020-04-10 RX ORDER — CEFUROXIME AXETIL 500 MG/1
500 TABLET ORAL
Qty: 6 | Refills: 0 | Status: DISCONTINUED | COMMUNITY
Start: 2019-04-15 | End: 2020-04-10

## 2020-04-10 RX ORDER — LEUPROLIDE ACETATE 22.5 MG
22.5 KIT INTRAMUSCULAR
Qty: 1 | Refills: 0 | Status: DISCONTINUED | COMMUNITY
Start: 2019-06-05 | End: 2020-04-10

## 2020-04-10 RX ORDER — TAMSULOSIN HYDROCHLORIDE 0.4 MG/1
0.4 CAPSULE ORAL
Qty: 90 | Refills: 2 | Status: DISCONTINUED | COMMUNITY
Start: 2019-12-19 | End: 2020-04-10

## 2020-04-10 RX ORDER — AMOXICILLIN AND CLAVULANATE POTASSIUM 875; 125 MG/1; MG/1
875-125 TABLET, COATED ORAL
Qty: 6 | Refills: 0 | Status: DISCONTINUED | COMMUNITY
Start: 2019-06-12 | End: 2020-04-10

## 2020-04-10 RX ORDER — LEUPROLIDE ACETATE 22.5 MG
22.5 KIT INTRAMUSCULAR
Qty: 1 | Refills: 0 | Status: DISCONTINUED | COMMUNITY
Start: 2019-08-14 | End: 2020-04-10

## 2020-04-10 NOTE — PHYSICAL EXAM
[Extraocular Movements] : extraocular movements were intact [Nasal Cavity] : the nasal mucosa and septum were normal [Oropharynx] : the oropharynx was normal [Auscultation Breath Sounds / Voice Sounds] : lungs were clear to auscultation bilaterally [Heart Sounds Gallop] : no gallops [Full Pulse] : the pedal pulses are present [Edema] : there was no peripheral edema [] : no hepato-splenomegaly [Oriented To Time, Place, And Person] : oriented to person, place, and time [Impaired Insight] : insight and judgment were intact [Affect] : the affect was normal [FreeTextEntry1] : NOT DONE, TELEPHONIC ENCOUNTER

## 2020-04-10 NOTE — DISCUSSION/SUMMARY
[Patient seen for WTC Monitoring ___] : Patient was seen for WTC monitoring [unfilled] [Please See Note in Chart and Documentation in Trial DB] : Please see note in chart and documentation in Trial DB. [FreeTextEntry3] : Exposure Hx: \par Occupation: [place]  - retired \par Dates: 09/11/2001 present at the [facility] collapse, \par Searching on 09/11 on the pile for the first 3 days. 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\par \par see follow up note for details \par diagnosed with CAP \par \par Preventive Screening: \par Colonoscopy: 2016\par \par Allergies: only if takes ASA and Coffee /face and body rash/ , or shrimp and coffee/ face swelling /\par Meds: Benadryl PRN Ventolin PRN \par Soc Hx: \par Smoking Status: non smoker \par Review of Systems—IAMQ reviewed with patient\par \par PE:\par to be performed at a later date during face to face encounter to complete WT monitoring visit\par \par Spirometry: to be performed at a later date during face to face encounter to complete WTC monitoring visit\par \par \par CXR: done 11/2019\par A/P : WTC monitoring visit \par See Additional Occ MEd follow up note \par

## 2020-04-10 NOTE — HISTORY OF PRESENT ILLNESS
[Home] : at home, [unfilled] , at the time of the visit. [Other Location: e.g. Home (Enter Location, City,State)___] : at [unfilled] [Patient] : the patient [Self] : self [FreeTextEntry1] : [place]  \par this encounter was conducted over  on the telephone during time of COVID 19 pandemic\par \par \par  prostate cancer recurrence, originally Dx 2017, recurred 2019 \par  \par  \par \par TNM Stage: c T 1c N 0 M 0 \par \par AJCC Stage (8th Ed): IIC. \par  In 2015 PSA  was 4; he had a "borderline" MRI but no biopsy. His father had prostate cancer in his 80's - ? metastatic. Repeat PSA was 7.1 with %free 17%. MRI noted 36cc prostate with no suspicious lesions. However i had recommended a biopsy given his race, FH and not favorable PSA density.\par \par Biopsy 6/17 demonstrated 3 positive cores: 1 grade 2\par \par \par 2019 HAD       repeated   biopsy notes mixture of grade 2 and 3 disease in higher volume\par \par \par Pily 4+3=7 adenocarcinoma of the prostate. He was referred by Dr. Saucedo who had been following patient for elevated PSA since 2017.\par PSAs:\par 11/6/18 - 9.67\par 1/30/18 - 6.46\par 4/4/17 - 7.12\par \par Patient had MRI of prostate done on 11/25/18 which showed prostate volume of 36 cc. And right, posterior medial, midgland to apex peripheral zone lesion. PIRADS 3. No FRITZ, SVI, or pelvic lymphadenopathy was noted.\par \par He underwent prostate biopsy on 4/18/19, pathology report indicated 11 out of 15 cores was positive for cancer with Pily score 4+3=7 involving 5% - 70% of the tissue.\par \par \par \par \par HE UNDERWENT seed implantation in 2019\par  last PSA 11/2019                 1.33 \par ha dysuria and burning on urination \par  was started on Flomax .4 mg DUE TO Dysuria / BURNING OF URINATION / \par  dose was increased with improvement of dysuria but he had lowered the does to .4 mg  2 weeks  ago with recurrence of Sx \par \par \par Nasal congestion improved with use of PO Cetirizine  and Azelastine nasal spray , he is using it on and off \par REPORTED NO recent SINUS  infections\par \par Uses albuterol PRN for SOB once/ twice  a week , feels it is effective for asthma \par \par  C/O itchy eyes ad watery eyes \par stated Patanol eye drops help eye Sx  \par \par \par spirometry : to be performed at a later date during face to face encounter to complete WTC monitoring visit\par \par \par Colonoscopy: reported that had a NL 2016\par \par Allergies: only if takes ASA and caffeinated Coffee /face and body rash/ , or shrimp and coffee/ face swelling and itching / with difficulties breathing\par HE IS VERY CAREFUL NOT TO Ingest  ASA AND Shrimp AND DID NOT HAVE ANY Systemic REACTIONS SINCE THEN SINCE 2015\par Soc Hx:  with 5 kids\par \par \par PCP: Pablito Horta \par

## 2020-04-10 NOTE — ASSESSMENT
[FreeTextEntry1] : prostate cancer \par s/p seed implantation \par pds HAD FALLEN AFTER TREATMENT - GOOD SIGN \par recommend to inc Flomax to .8 mg\par  he will follow dimitri with Rad onc as scheduled \par  will needs PSA at next appointment \par \par \par CHRONIC RHINITIS  cr- CONT Antihistamine NASAL SPRAY \par CONT CETIRIZINE \par  ASTHMA- MILD INTERMITTENT \par CONT PRN RESCUE INHALER

## 2020-04-10 NOTE — HISTORY OF PRESENT ILLNESS
[Home] : at home, [unfilled] , at the time of the visit. [Other Location: e.g. Home (Enter Location, City,State)___] : at [unfilled] [Patient] : the patient [Self] : self [FreeTextEntry1] : [place]  \par this encounter was conducted over  on the telephone during time of COVID 19 pandemic\par \par \par  prostate cancer recurrence, originally Dx 2017, recurred 2019 \par  \par  \par \par TNM Stage: c T 1c N 0 M 0 \par \par AJCC Stage (8th Ed): IIC. \par  In 2015 PSA  was 4; he had a "borderline" MRI but no biopsy. His father had prostate cancer in his 80's - ? metastatic. Repeat PSA was 7.1 with %free 17%. MRI noted 36cc prostate with no suspicious lesions. However i had recommended a biopsy given his race, FH and not favorable PSA density.\par \par Biopsy 6/17 demonstrated 3 positive cores: 1 grade 2\par \par \par 2019 HAD       repeated   biopsy notes mixture of grade 2 and 3 disease in higher volume\par \par \par Pily 4+3=7 adenocarcinoma of the prostate. He was referred by Dr. Saucedo who had been following patient for elevated PSA since 2017.\par PSAs:\par 11/6/18 - 9.67\par 1/30/18 - 6.46\par 4/4/17 - 7.12\par \par Patient had MRI of prostate done on 11/25/18 which showed prostate volume of 36 cc. And right, posterior medial, midgland to apex peripheral zone lesion. PIRADS 3. No FRITZ, SVI, or pelvic lymphadenopathy was noted.\par \par He underwent prostate biopsy on 4/18/19, pathology report indicated 11 out of 15 cores was positive for cancer with Pily score 4+3=7 involving 5% - 70% of the tissue.\par \par \par \par \par HE UNDERWENT seed implantation in 2019\par  last PSA 11/2019                 1.33 \par ha dysuria and burning on urination \par  was started on Flomax .4 mg DUE TO Dysuria / BURNING OF URINATION / \par  dose was increased with improvement of dysuria but he had lowered the does to .4 mg  2 weeks  ago with recurrence of Sx \par \par \par Nasal congestion improved with use of PO Cetirizine  and Azelastine nasal spray , he is using it on and off \par REPORTED NO recent SINUS  infections\par \par Uses albuterol PRN for SOB once/ twice  a week , feels it is effective for asthma \par \par  C/O itchy eyes ad watery eyes \par stated Patanol eye drops help eye Sx  \par \par \par spirometry : to be performed at a later date during face to face encounter to complete WTC monitoring visit\par \par \par Colonoscopy: reported that had a NL 2016\par \par Allergies: only if takes ASA and caffeinated Coffee /face and body rash/ , or shrimp and coffee/ face swelling and itching / with difficulties breathing\par HE IS VERY CAREFUL NOT TO Ingest  ASA AND Shrimp AND DID NOT HAVE ANY Systemic REACTIONS SINCE THEN SINCE 2015\par Soc Hx:  with 5 kids\par \par \par PCP: aPblito Horta \par

## 2020-04-10 NOTE — DISCUSSION/SUMMARY
[Patient seen for WTC Monitoring ___] : Patient was seen for WTC monitoring [unfilled] [Please See Note in Chart and Documentation in Trial DB] : Please see note in chart and documentation in Trial DB. [FreeTextEntry3] : Exposure Hx: \par [place]  - [place] \par Dates: 09/11/2001 present at the [facility] collapse, \par Searching on 09/11 on the pile for the first 3 days. 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\par \par see follow up note for details \par diagnosed with CAP \par \par Preventive Screening: \par Colonoscopy: 2016\par \par Allergies: only if takes ASA and Coffee /face and body rash/ , or shrimp and coffee/ face swelling /\par Meds: Benadryl PRN Ventolin PRN \par Soc Hx: \par Smoking Status: non smoker \par Review of Systems—IAMQ reviewed with patient\par \par PE:\par to be performed at a later date during face to face encounter to complete WT monitoring visit\par \par Spirometry: to be performed at a later date during face to face encounter to complete WTC monitoring visit\par \par \par CXR: done 11/2019\par A/P : WTC monitoring visit \par See Additional Occ MEd follow up note \par

## 2020-05-10 ENCOUNTER — FORM ENCOUNTER (OUTPATIENT)
Age: 65
End: 2020-05-10

## 2020-05-11 ENCOUNTER — OUTPATIENT (OUTPATIENT)
Dept: OUTPATIENT SERVICES | Facility: HOSPITAL | Age: 65
LOS: 1 days | Discharge: HOME | End: 2020-05-11

## 2020-05-11 ENCOUNTER — FORM ENCOUNTER (OUTPATIENT)
Age: 65
End: 2020-05-11

## 2020-05-11 DIAGNOSIS — Z98.890 OTHER SPECIFIED POSTPROCEDURAL STATES: Chronic | ICD-10-CM

## 2020-05-11 DIAGNOSIS — Z92.3 PERSONAL HISTORY OF IRRADIATION: Chronic | ICD-10-CM

## 2020-05-14 ENCOUNTER — APPOINTMENT (OUTPATIENT)
Dept: RADIATION ONCOLOGY | Facility: CLINIC | Age: 65
End: 2020-05-14
Payer: COMMERCIAL

## 2020-05-14 PROCEDURE — 99213 OFFICE O/P EST LOW 20 MIN: CPT | Mod: 95

## 2020-05-14 NOTE — REVIEW OF SYSTEMS
[IPSS Score (0-40): ___] : IPSS score: [unfilled] [EPIC-CP Score (0-60): ___] : EPIC-CP score: [unfilled] [Negative] : Endocrine [Hematuria: Grade 0] : Hematuria: Grade 0 [Urinary Incontinence: Grade 0] : Urinary Incontinence: Grade 0  [Urinary Retention: Grade 0] : Urinary Retention: Grade 0 [Urinary Tract Pain: Grade 1 - Mild pain] : Urinary Tract Pain: Grade 1 - Mild pain [Urinary Urgency: Grade 0] : Urinary Urgency: Grade 0 [Ejaculation Disorder: Grade 0] : Ejaculation Disorder: Grade 0 [Erectile Dysfunction: Grade 0] : Erectile Dysfunction: Grade 0 [Nocturia] : nocturia [Urinary Frequency: Grade 1 - Present] : Urinary Frequency: Grade 1 - Present [Urinary Frequency] : no urinary frequency

## 2020-05-14 NOTE — HISTORY OF PRESENT ILLNESS
[Home] : at home, [unfilled] , at the time of the visit. [[facility] Office: ([facility]___] : at the medical office located in  [Patient] : the patient [Self] : self [FreeTextEntry2] : Quique Baxter [FreeTextEntry4] : Evy Munoz [FreeTextEntry1] : Quique Boudreaux is a 63 years old male with stage IIC Pily 4+3=7 adenocarcinoma of the prostate. He elected combination therapy of external beam radiation therapy to a dose of 4500 cGy between 8/9/19 - 9/13/19. He underwent a radioactive seed implant of the prostate on 10/16/19 dose 100 cGy after completion of EBRT. Patient tolerated radiation treatment well without developing any grade 3 or higher acute toxicity as a result of his treatment.He did develop some urinary bother for which Flomax was prescribed.\par \par Patient presents here today for follow up. His recent PSA is 2.21 on 5/4/20 from 1.33 in November. He report nocturia 3-4 times per night, dysuria, and frequency. He denies any other urinary bother. He is sexually active.

## 2020-05-14 NOTE — DISEASE MANAGEMENT
[1] : T1 [c] : c [0] : M0 [0-10] : 0 -10 ng/mL [7(4+3)] : Pily Score 7(4+3) [] : Patient had a Prostate MRI [3] : 3 [IIC] : IIC [Radiation Therapy] : Radiation Therapy [Treatment with radiation therapy] : Treatment with radiation therapy [EBRT] : EBRT [LDR] : LDR [Treatment with androgen ablation] : Treatment with androgen ablation [BiopsyDate] : 4/18/19 [TotalCores] : 15 [MaxCoreInvolvement] : 70% [TotalPositiveCores] : 11 [RadiationCompletedDate] : 10/16/19 [EBRTDose] : 6572 [EBRTFractions] : 25 [LDRDose] : 100

## 2020-05-27 LAB
APPEARANCE: CLEAR
BILIRUBIN URINE: NEGATIVE
BLOOD URINE: NEGATIVE
COLOR: YELLOW
GLUCOSE QUALITATIVE U: NEGATIVE
KETONES URINE: NEGATIVE
LEUKOCYTE ESTERASE URINE: NEGATIVE
NITRITE URINE: NEGATIVE
PH URINE: 5.5
PROTEIN URINE: NORMAL
PSA SERPL-MCNC: 2.21 NG/ML
SPECIFIC GRAVITY URINE: 1.03
UROBILINOGEN URINE: NORMAL

## 2020-07-13 ENCOUNTER — RX RENEWAL (OUTPATIENT)
Age: 65
End: 2020-07-13

## 2020-09-22 RX ORDER — VENLAFAXINE HYDROCHLORIDE 75 MG/1
75 CAPSULE, EXTENDED RELEASE ORAL
Qty: 30 | Refills: 2 | Status: DISCONTINUED | COMMUNITY
Start: 2020-05-14 | End: 2020-09-22

## 2021-04-13 LAB — PSA SERPL-MCNC: 2.02 NG/ML

## 2021-04-20 ENCOUNTER — FORM ENCOUNTER (OUTPATIENT)
Age: 66
End: 2021-04-20

## 2021-04-22 ENCOUNTER — LABORATORY RESULT (OUTPATIENT)
Age: 66
End: 2021-04-22

## 2021-04-22 ENCOUNTER — APPOINTMENT (OUTPATIENT)
Dept: RADIATION ONCOLOGY | Facility: CLINIC | Age: 66
End: 2021-04-22
Payer: COMMERCIAL

## 2021-04-22 VITALS
HEART RATE: 94 BPM | WEIGHT: 194 LBS | RESPIRATION RATE: 18 BRPM | BODY MASS INDEX: 30.38 KG/M2 | OXYGEN SATURATION: 97 % | SYSTOLIC BLOOD PRESSURE: 131 MMHG | DIASTOLIC BLOOD PRESSURE: 84 MMHG | TEMPERATURE: 97.5 F

## 2021-04-22 VITALS — BODY MASS INDEX: 30.38 KG/M2 | WEIGHT: 194 LBS

## 2021-04-22 PROCEDURE — 99213 OFFICE O/P EST LOW 20 MIN: CPT

## 2021-04-22 NOTE — REVIEW OF SYSTEMS
[EPIC-CP Score (0-60): ___] : EPIC-CP score: [unfilled] [Negative] : Endocrine [Hematuria: Grade 0] : Hematuria: Grade 0 [Urinary Incontinence: Grade 0] : Urinary Incontinence: Grade 0  [Urinary Retention: Grade 0] : Urinary Retention: Grade 0 [Urinary Urgency: Grade 0] : Urinary Urgency: Grade 0 [Urinary Frequency: Grade 1 - Present] : Urinary Frequency: Grade 1 - Present [Ejaculation Disorder: Grade 0] : Ejaculation Disorder: Grade 0 [Erectile Dysfunction: Grade 0] : Erectile Dysfunction: Grade 0 [Urinary Tract Pain: Grade 0] : Urinary Tract Pain: Grade 0 [Nocturia] : nocturia [IPSS Score (0-40): ___] : IPSS score: [unfilled]

## 2021-04-22 NOTE — HISTORY OF PRESENT ILLNESS
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 He elected combination therapy of external beam radiation therapy to a dose of 4500 cGy between 8/9/19 - 9/13/19. He underwent a radioactive seed implant of the prostate on 10/16/19 dose 100 cGy after completion of EBRT. Patient tolerated radiation treatment well without developing any grade 3 or higher acute toxicity as a result of his treatment.He did develop some urinary bother for which Flomax was prescribed.\par \par PSA\par 11/21/19  -  1.33\par 5/4/20  -  2.21\par 4/7/21  -  2.02\par \par Patient presents here today for follow up. He report occasional weak urinary flow, frequency and and fowl smelling urine. He denies any urinary or bowel issue and he is sexually active.

## 2021-04-22 NOTE — DISEASE MANAGEMENT
[1] : T1 [c] : c [0] : M0 [0-10] : 0 -10 ng/mL [7(4+3)] : Pily Score 7(4+3) [] : Patient had a Prostate MRI [3] : 3 [IIC] : IIC [Radiation Therapy] : Radiation Therapy [Treatment with radiation therapy] : Treatment with radiation therapy [EBRT] : EBRT [LDR] : LDR [Treatment with androgen ablation] : Treatment with androgen ablation [BiopsyDate] : 4/18/19 [TotalCores] : 15 [TotalPositiveCores] : 11 [MaxCoreInvolvement] : 70% [RadiationCompletedDate] : 10/16/19 [EBRTDose] : 8740 [EBRTFractions] : 25 [LDRDose] : 100

## 2021-04-26 LAB
APPEARANCE: CLEAR
BILIRUBIN URINE: NEGATIVE
BLOOD URINE: NEGATIVE
COLOR: YELLOW
GLUCOSE QUALITATIVE U: NEGATIVE
KETONES URINE: NEGATIVE
LEUKOCYTE ESTERASE URINE: NEGATIVE
NITRITE URINE: NEGATIVE
PH URINE: 6
PROTEIN URINE: NORMAL
SPECIFIC GRAVITY URINE: 1.03
UROBILINOGEN URINE: NORMAL

## 2021-05-02 ENCOUNTER — FORM ENCOUNTER (OUTPATIENT)
Age: 66
End: 2021-05-02

## 2021-05-19 ENCOUNTER — APPOINTMENT (OUTPATIENT)
Dept: OTHER | Facility: CLINIC | Age: 66
End: 2021-05-19
Payer: COMMERCIAL

## 2021-05-19 PROCEDURE — 99442: CPT | Mod: 95

## 2021-05-19 PROCEDURE — 99396 PREV VISIT EST AGE 40-64: CPT | Mod: 95

## 2021-05-19 RX ORDER — TAMSULOSIN HYDROCHLORIDE 0.4 MG/1
0.4 CAPSULE ORAL
Qty: 60 | Refills: 2 | Status: DISCONTINUED | COMMUNITY
Start: 2020-02-24 | End: 2021-05-19

## 2021-05-20 NOTE — PAST MEDICAL HISTORY
[FreeTextEntry1] : patient is a  that was exposed to [facility] dust and debris after 09 11 2001\par \par

## 2021-05-20 NOTE — DISCUSSION/SUMMARY
[Home] : at home, [unfilled] , at the time of the visit. [Other Location: e.g. Home (Enter Location, City,State)___] : at [unfilled] [Verbal consent obtained from patient] : the patient, [unfilled] [Patient seen for WTC Monitoring ___] : Patient was seen for WTC monitoring [unfilled] [Please See Note in Chart and Documentation in Trial DB] : Please see note in chart and documentation in Trial DB. [FreeTextEntry3] : Exposure Hx: \par [place]  - [place] \par Dates: 09/11/2001 present at the [facility] collapse, \par Searching on 09/11 on the pile for the first 3 days. 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

## 2021-05-20 NOTE — HISTORY OF PRESENT ILLNESS
[Home] : at home, [unfilled] , at the time of the visit. [Other Location: e.g. Home (Enter Location, City,State)___] : at [unfilled] [Verbal consent obtained from patient] : the patient, [unfilled] [FreeTextEntry1] : [place]  \par \par \par \par \par  \par  \par Quique Boudreaux is a 65 years old male with stage IIC Pily 4+3=7 adenocarcinoma of the prostate. He elected combination therapy of external beam radiation therapy to a dose of 4500 cGy between 8/9/19 - 9/13/19. He underwent a radioactive seed implant of the prostate on 10/16/19 dose 100 cGy after completion of EBRT. Patient tolerated radiation treatment well. He did develop some urinary bother for which Flomax was prescribed.\par he is also taking Cialis 5 mg daily. \par \par PSA\par 11/21/19 - 1.33\par 5/4/20 - 2.21\par 4/7/21 - 2.02\par \par  He report occasional weak urinary flow, frequency. He denies any urinary or bowel issue and he is sexually active. \par \par TNM Stage: c T 1c N 0 M 0 \par \par AJCC Stage (8th Ed): IIC. \par  In 2015 PSA  was 4; he had a "borderline" MRI but no biopsy. His father had prostate cancer in his 80's - ? metastatic. Repeat PSA was 7.1 with %free 17%. MRI noted 36cc prostate with no suspicious lesions. However i had recommended a biopsy given his race, FH and not favorable PSA density.\par \par Biopsy 6/17 demonstrated 3 positive cores: 1 grade 2\par \par \par 2019 HAD       repeated   biopsy notes mixture of grade 2 and 3 disease in higher volume\par \par \par \par Patient had MRI of prostate done on 11/25/18 which showed prostate volume of 36 cc. And right, posterior medial, midgland to apex peripheral zone lesion. PIRADS 3. No FRITZ, SVI, or pelvic lymphadenopathy was noted.\par \par He underwent prostate biopsy on 4/18/19, pathology report indicated 11 out of 15 cores was positive for cancer with Baldwin score 4+3=7 involving 5% - 70% of the tissue.\par \par \par \par \par \par \par \par Nasal congestion improved with use of PO Cetirizine  and Azelastine nasal spray , he is using it on and off \par REPORTED NO recent SINUS  infections\par \par Uses albuterol PRN for SOB once/ twice  a week , feels it is effective for asthma \par \par  C/O itchy eyes ad watery eyes \par stated Patanol eye drops help eye Sx  \par \par \par \par Colonoscopy: reported that had a NL 2016\par \par Allergies: only if takes ASA and caffeinated Coffee /face and body rash/ , or shrimp and coffee/ face swelling and itching / with difficulties breathing\par HE IS VERY CAREFUL NOT TO Ingest  ASA AND Shrimp AND DID NOT HAVE ANY Systemic REACTIONS SINCE THEN SINCE 2015\par Soc Hx:  with 5 kids\par \par \par PCP: Pablito Horta \par

## 2021-05-20 NOTE — PHYSICAL EXAM
[FreeTextEntry1] : NOT DONE, TELEPHONIC ENCOUNTER [Extraocular Movements] : extraocular movements were intact [Nasal Cavity] : the nasal mucosa and septum were normal [Oropharynx] : the oropharynx was normal [Auscultation Breath Sounds / Voice Sounds] : lungs were clear to auscultation bilaterally [Heart Sounds Gallop] : no gallops [Full Pulse] : the pedal pulses are present [Edema] : there was no peripheral edema [] : no hepato-splenomegaly [Oriented To Time, Place, And Person] : oriented to person, place, and time [Impaired Insight] : insight and judgment were intact [Affect] : the affect was normal

## 2021-05-20 NOTE — ASSESSMENT
[FreeTextEntry1] : prostate cancer \par s/p seed implantation \par \par  he will follow dimitri with Rad onc as scheduled \par Has urinary SX are  stable. His recent PSA is 2.02 ng/ml on 4/7/21. He will continue taking Flomax and Tadalafil 5mg daily. He is otherwise doing well.\par \par \par \par \par CHRONIC RHINITIS  cr- CONT Antihistamine NASAL SPRAY \par CONT CETIRIZINE PO \par  ASTHMA- MILD INTERMITTENT \par CONT PRN RESCUE INHALER \par \par  allergic rhinitis - eye drops renewed

## 2021-05-20 NOTE — DISCUSSION/SUMMARY
[Home] : at home, [unfilled] , at the time of the visit. [Other Location: e.g. Home (Enter Location, City,State)___] : at [unfilled] [Verbal consent obtained from patient] : the patient, [unfilled] [Patient seen for WTC Monitoring ___] : Patient was seen for WTC monitoring [unfilled] [Please See Note in Chart and Documentation in Trial DB] : Please see note in chart and documentation in Trial DB. [FreeTextEntry3] : Exposure Hx: \par [place]  - [place] \par Dates: 09/11/2001 present at the [facility] collapse, \par Searching on 09/11 on the pile for the first 3 days. 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

## 2021-05-20 NOTE — HISTORY OF PRESENT ILLNESS
[Home] : at home, [unfilled] , at the time of the visit. [Other Location: e.g. Home (Enter Location, City,State)___] : at [unfilled] [Verbal consent obtained from patient] : the patient, [unfilled] [FreeTextEntry1] : [place]  \par \par \par \par \par  \par  \par Quique Boudreaux is a 65 years old male with stage IIC Pily 4+3=7 adenocarcinoma of the prostate. He elected combination therapy of external beam radiation therapy to a dose of 4500 cGy between 8/9/19 - 9/13/19. He underwent a radioactive seed implant of the prostate on 10/16/19 dose 100 cGy after completion of EBRT. Patient tolerated radiation treatment well. He did develop some urinary bother for which Flomax was prescribed.\par he is also taking Cialis 5 mg daily. \par \par PSA\par 11/21/19 - 1.33\par 5/4/20 - 2.21\par 4/7/21 - 2.02\par \par  He report occasional weak urinary flow, frequency. He denies any urinary or bowel issue and he is sexually active. \par \par TNM Stage: c T 1c N 0 M 0 \par \par AJCC Stage (8th Ed): IIC. \par  In 2015 PSA  was 4; he had a "borderline" MRI but no biopsy. His father had prostate cancer in his 80's - ? metastatic. Repeat PSA was 7.1 with %free 17%. MRI noted 36cc prostate with no suspicious lesions. However i had recommended a biopsy given his race, FH and not favorable PSA density.\par \par Biopsy 6/17 demonstrated 3 positive cores: 1 grade 2\par \par \par 2019 HAD       repeated   biopsy notes mixture of grade 2 and 3 disease in higher volume\par \par \par \par Patient had MRI of prostate done on 11/25/18 which showed prostate volume of 36 cc. And right, posterior medial, midgland to apex peripheral zone lesion. PIRADS 3. No FRITZ, SVI, or pelvic lymphadenopathy was noted.\par \par He underwent prostate biopsy on 4/18/19, pathology report indicated 11 out of 15 cores was positive for cancer with Grafton score 4+3=7 involving 5% - 70% of the tissue.\par \par \par \par \par \par \par \par Nasal congestion improved with use of PO Cetirizine  and Azelastine nasal spray , he is using it on and off \par REPORTED NO recent SINUS  infections\par \par Uses albuterol PRN for SOB once/ twice  a week , feels it is effective for asthma \par \par  C/O itchy eyes ad watery eyes \par stated Patanol eye drops help eye Sx  \par \par \par \par Colonoscopy: reported that had a NL 2016\par \par Allergies: only if takes ASA and caffeinated Coffee /face and body rash/ , or shrimp and coffee/ face swelling and itching / with difficulties breathing\par HE IS VERY CAREFUL NOT TO Ingest  ASA AND Shrimp AND DID NOT HAVE ANY Systemic REACTIONS SINCE THEN SINCE 2015\par Soc Hx:  with 5 kids\par \par \par PCP: Pablito Horta \par

## 2021-05-20 NOTE — PAST MEDICAL HISTORY
[FreeTextEntry1] : patient is a  that was exposed to [facility] dust and debris after 09 11 2001\par \par

## 2021-06-08 NOTE — H&P PST ADULT - CORNEAL ABRASION RISK
[address] ENCOUNTER      Pt Name: Valdez Salas  MRN: [id number]  YOB: 1958  Date of evaluation: 6/8/2021  Provider: Raul New MD    CHIEF COMPLAINT       Chief Complaint   Patient [facility]   [facility]    Shortness of Breath         HISTORY OF PRESENT ILLNESS  (Location/Symptom, Timing/Onset, Context/Setting, Quality, Duration, Modifying Factors, Severity.)   Valdez Salas is a 63 y.o. female who presents to the emergency department planing of generalized weakness falls and shortness of breath. She has not been feeling well for a few weeks. She was eventually admitted at [facility] for 2 days after a fall and was diagnosed with pneumonia. Discharged home yesterday. At home she has been short of breath and feeling weak and having difficulty moving around. She has had no fever or chest pain. Denies any head injury. 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 She is on hemodialysis. Also has COPD, hypertension, hyperlipidemia. Nursing notes were reviewed. REVIEW OFSYSTEMS    (2-9 systems for level 4, 10 or more for level 5)   ROS:  General:  No fevers, no chills, no weakness  Cardiovascular:  No chest pain, no palpitations  Respiratory:  + shortness of breath, + cough, no wheezing  Gastrointestinal:  No pain, no nausea, no vomiting, no diarrhea  Musculoskeletal:  No muscle pain, no joint pain  Skin:  No rash, no easy bruising  Neurologic:  No speech problems, no headache, no extremity weakness  Psychiatric:  No anxiety  Genitourinary:  No dysuria, no hematuria    Except as noted above the remainder of the review of systems was reviewed and negative.        PAST MEDICAL HISTORY     Past Medical History:   Diagnosis Date    CHF (congestive heart failure) (HCC)     Chronic kidney disease     COPD (chronic obstructive pulmonary disease) (HCC)     Hyperlipidemia     Hypertension SURGICAL HISTORY       Past Surgical History:   Procedure Laterality Date    ABDOMEN SURGERY      AV FISTULA CREATION      left arm    LEG SURGERY      Rods in right femur         CURRENT MEDICATIONS       Previous Medications    No medications on file       ALLERGIES     Gabapentin and Hct [hydrochlorothiazide]    FAMILY HISTORY     History reviewed. No pertinent family history. SOCIAL HISTORY       Social History     Socioeconomic History    Marital status:      Spouse name: None    Number of children: None    Years of education: None    Highest education level: None   Occupational History    None   Tobacco Use    Smoking status: Former Smoker    Smokeless tobacco: Never Used   Substance and Sexual Activity    Alcohol use: None    Drug use: None    Sexual activity: None   Other Topics Concern    None   Social History Narrative    None     Social Determinants of Health     Financial Resource Strain:     Difficulty of Paying Living Expenses:    Food Insecurity:     Worried About Running Out of Food in the Last Year:     Ran Out of Food in the Last Year:    Transportation Needs:     Lack of Transportation (Medical):      Lack of Transportation (Non-Medical):    Physical Activity:     Days of Exercise per Week:     Minutes of Exercise per Session:    Stress:     Feeling of Stress :    Social Connections:     Frequency of Communication with Friends and Family:     Frequency of Social Gatherings with Friends and Family:     Attends Congregation Services:     Active Member of Clubs or Organizations:     Attends Club or Organization Meetings:     Marital Status:    Intimate Partner Violence:     Fear of Current or Ex-Partner:     Emotionally Abused:     Physically Abused:     Sexually Abused:          PHYSICAL EXAM    (up to 7 for level 4, 8 or more for level 5)     ED Triage Vitals [06/08/21 1744]   BP Temp Temp src Pulse Resp SpO2 Height Weight   (!) 167/99 -- -- 95 30 99 % 5' 8\" (1.727 m) 230 lb (104.3 kg)       Physical Exam  General :Patient is awake, alert, oriented, in no acute distress, nontoxic appearing  HEENT: Pupils are equally round and reactive to light, EOMI, conjunctivae clear. Oral mucosa is moist, no exudate. Uvula is midline  Neck: Neck is supple, full range of motion, trachea midline  Cardiac: Heart regular rate, rhythm, no murmurs, rubs, or gallops  Lungs: Lungs are clear to auscultation, there is no wheezing, rhonchi,   + rales RLL. There is no use of accessory muscles. Chest wall: There is no tenderness to palpation over the chest wall or over ribs  Abdomen: Abdomen is soft, nontender, nondistended. There is no firm or pulsatile masses, no rebound rigidity or guarding. Musculoskeletal: 5 out of 5 strength in all 4 extremities. No focal muscle deficits are appreciated  Neuro: Motor intact, sensory intact, level of consciousness is normal, cerebellar function is normal, reflexes are grossly normal. No evidence of incontinence or loss of bowel or bladder function, no saddle anesthesia noted   Dermatology: Skin is warm and dry  Psych: Mentation is grossly normal, cognition is grossly normal. Affect is appropriate. DIAGNOSTIC RESULTS     EKG: All EKG's are interpreted by the Emergency Department Physician who either signs or Co-signs this chart in the [address] a cardiologist.    The EKG interpreted by me shows  RADIOLOGY:   Non-plain film images such as CT, Ultrasound and MRI are read by the radiologist. Plain radiographic images are visualized and preliminarily interpreted by the emergency physician with the below findings:      ? Radiologist's Report Reviewed:  XR CHEST PORTABLE   Final Result      Cardiomegaly and mild interstitial edema with moderate left pleural effusion.          CT ABDOMEN PELVIS WO CONTRAST Additional Contrast? None    (Results Pending)         ED BEDSIDE ULTRASOUND:   Performed by ED Physician - none    LABS:    I have reviewed and interpreted all of the currently available lab results from this visit (ifapplicable):  Results for orders placed or performed during the hospital encounter of 06/08/21   CBC Auto Differential   Result Value Ref Range    WBC 7.7 4.0 - 11.0 K/uL    RBC 3.03 (L) 3.80 - 5.80 M/uL    Hemoglobin 9.1 (L) 11.5 - 16.5 g/dL    Hematocrit 30.2 (L) 37.0 - 47.0 %    MCV 99.7 80.0 - 100.0 fL    MCH 30.0 27.0 - 32.0 pg    MCHC 30.1 (L) 31.0 - 35.0 g/dL    RDW 24.1 (H) 11.0 - 16.0 %    Platelets 495 064 - 191 K/uL    MPV 10.5 (H) 6.0 - 10.0 fL    Neutrophils % 74.2 %    Immature Granulocytes % 0.4 0.0 - 5.0 %    Lymphocytes % 16.4 %    Monocytes % 8.2 %    Eosinophils % 0.4 %    Basophils % 0.4 %    Neutrophils Absolute 5.7 2.0 - 7.5 K/uL    Immature Granulocytes # 0.0 K/uL    Lymphocytes Absolute 1.3 (L) 1.5 - 4.0 K/uL    Monocytes Absolute 0.6 0.2 - 0.8 K/uL    Eosinophils Absolute 0.0 0.0 - 0.4 K/uL    Basophils Absolute 0.0 0.0 - 0.1 K/uL   Comprehensive Metabolic Panel   Result Value Ref Range    Sodium 130 (L) 136 - 145 mmol/L    Potassium 6.2 (HH) 3.4 - 5.1 mmol/L    Chloride 87 (L) 98 - 107 mmol/L    CO2 24 20 - 30 mmol/L    Anion Gap 19 (H) 3 - 16    Glucose 84 74 - 106 mg/dL    BUN 45 (H) 6 - 20 mg/dL    CREATININE 7.2 (H) 0.4 - 1.2 mg/dL    GFR Non-African American 6 (L) >59    GFR  7 (L) >59    Calcium 10.2 8.5 - 10.5 mg/dL    Total Protein 6.7 6.4 - 8.3 g/dL    Albumin 3.8 3.4 - 4.8 g/dL    Albumin/Globulin Ratio 1.3 0.8 - 2.0    Total Bilirubin 0.6 0.3 - 1.2 mg/dL    Alkaline Phosphatase 113 (H) 25 - 100 U/L    ALT 28 4 - 36 U/L    AST 26 8 - 33 U/L    Globulin 2.9 g/dL   Troponin   Result Value Ref Range    Troponin <0.30 <0.30 ng/mL   EKG 12 Lead   Result Value Ref Range    Ventricular Rate 91 BPM    Atrial Rate 91 BPM    P-R Interval 124 ms    QRS Duration 72 ms    Q-T Interval 366 ms    QTc Calculation (Bazett) 450 ms    P Axis 60 degrees    R Axis 110 degrees    T Axis 9 degrees    Diagnosis EKG performed in ER and to be interpretated by ER physician in [address] by MD, ER ([id number]),  Jevon MARCOS ([id number]) on 6/8/2021 6:00:07 PM        All other labs were within normal range or not returned as of this dictation. EMERGENCY DEPARTMENT COURSE and DIFFERENTIAL DIAGNOSIS/MDM:   Vitals:    Vitals:    06/08/21 1744 06/08/21 1807   BP: (!) 167/99 (!) 167/105   Pulse: 95 91   Resp: 30 23   SpO2: 99% 99%   Weight: 230 lb (104.3 kg)    Height: 5' 8\" (1.727 m)        MEDICATIONS ADMINISTERED IN ED:  Medications - No data to display  I have signed out Lily Maxwell's Emergency Department care to Dr Daniel Guadarrama. We discussed the pertinent history, physical exam, completed/pending test results (if applicable) and current treatment plan. Please refer to his/her chart for the patients remaining Emergency Department course and final disposition. The patient will follow-up with their PCP in 1-2 days for reevaluation. If the patient or family members have anyfurther concerns or any worsening symptoms they will return to the ED for reevaluation. CONSULTS:  None    PROCEDURES:  Procedures    CRITICAL CARE TIME    Total Critical Care time was 0 minutes, excluding separately reportable procedures. There was a high probability of clinically significant/life threatening deterioration in the patient's condition which required my urgent intervention. FINAL IMPRESSION      1. Shortness of breath    2. Hyperkalemia    3. ESRD (end stage renal disease) ([facility] [address]          DISPOSITION/PLAN   DISPOSITION        PATIENT REFERRED TO:  No follow-up provider specified. DISCHARGE MEDICATIONS:  New Prescriptions    No medications on file       Comment: Please note this report has been produced using speech recognition software and may contain errorsrelated to that system including errors in grammar, punctuation, and spelling, as well as words and phrases that may be inappropriate.  If there are any questions or concerns please feel free to contact the dictating providerfor clarification.     Bebeto Tello MD  Attending Emergency Physician              Bebeto Tello MD  06/09/21 [id number] denies

## 2021-06-21 ENCOUNTER — RX RENEWAL (OUTPATIENT)
Age: 66
End: 2021-06-21

## 2021-06-21 RX ORDER — OLOPATADINE HCL 1 MG/ML
0.1 SOLUTION/ DROPS OPHTHALMIC TWICE DAILY
Qty: 5 | Refills: 0 | Status: ACTIVE | COMMUNITY
Start: 2019-04-11 | End: 1900-01-01

## 2021-07-06 ENCOUNTER — RX RENEWAL (OUTPATIENT)
Age: 66
End: 2021-07-06

## 2021-07-19 ENCOUNTER — RX RENEWAL (OUTPATIENT)
Age: 66
End: 2021-07-19

## 2021-08-05 NOTE — H&P PST ADULT - ANTERIOR CERVICAL R
Xeljuliennez Pregnancy And Lactation Text: This medication is Pregnancy Category D and is not considered safe during pregnancy.  The risk during breast feeding is also uncertain. normal

## 2021-08-18 ENCOUNTER — RX RENEWAL (OUTPATIENT)
Age: 66
End: 2021-08-18

## 2021-09-12 ENCOUNTER — RX RENEWAL (OUTPATIENT)
Age: 66
End: 2021-09-12

## 2021-11-22 ENCOUNTER — RX RENEWAL (OUTPATIENT)
Age: 66
End: 2021-11-22

## 2022-03-01 ENCOUNTER — RX RENEWAL (OUTPATIENT)
Age: 67
End: 2022-03-01

## 2022-03-23 ENCOUNTER — FORM ENCOUNTER (OUTPATIENT)
Age: 67
End: 2022-03-23

## 2022-03-23 NOTE — REVIEW OF SYSTEMS
[IPSS Score (0-40): ___] : IPSS score: [unfilled] [EPIC-CP Score (0-60): ___] : EPIC-CP score: [unfilled] [Negative] : Endocrine [Hematuria: Grade 0] : Hematuria: Grade 0 [Urinary Incontinence: Grade 0] : Urinary Incontinence: Grade 0  [Urinary Retention: Grade 0] : Urinary Retention: Grade 0 [Urinary Tract Pain: Grade 0] : Urinary Tract Pain: Grade 0 [Urinary Urgency: Grade 0] : Urinary Urgency: Grade 0 [Ejaculation Disorder: Grade 0] : Ejaculation Disorder: Grade 0 [Urinary Frequency: Grade 1 - Present] : Urinary Frequency: Grade 1 - Present [Erectile Dysfunction: Grade 0] : Erectile Dysfunction: Grade 0

## 2022-03-24 ENCOUNTER — APPOINTMENT (OUTPATIENT)
Dept: RADIATION ONCOLOGY | Facility: CLINIC | Age: 67
End: 2022-03-24
Payer: COMMERCIAL

## 2022-03-24 VITALS
SYSTOLIC BLOOD PRESSURE: 119 MMHG | RESPIRATION RATE: 17 BRPM | TEMPERATURE: 96 F | DIASTOLIC BLOOD PRESSURE: 75 MMHG | OXYGEN SATURATION: 97 % | WEIGHT: 189.7 LBS | HEART RATE: 77 BPM | BODY MASS INDEX: 29.71 KG/M2

## 2022-03-24 PROCEDURE — 99213 OFFICE O/P EST LOW 20 MIN: CPT

## 2022-03-24 NOTE — DISEASE MANAGEMENT
[1] : T1 [c] : c [0] : M0 [0-10] : 0 -10 ng/mL [] : Patient had a Prostate MRI [3] : 3 [IIC] : IIC [Radiation Therapy] : Radiation Therapy [Treatment with radiation therapy] : Treatment with radiation therapy [EBRT] : EBRT [LDR] : LDR [Treatment with androgen ablation] : Treatment with androgen ablation [TotalCores] : 15 [TotalPositiveCores] : 11 [MaxCoreInvolvement] : 70% [RadiationCompletedDate] : 10/16/19 [EBRTDose] : 6512 [EBRTFractions] : 25 [LDRDose] : 100

## 2022-03-24 NOTE — HISTORY OF PRESENT ILLNESS
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 He elected combination therapy of external beam radiation therapy to a dose of 4500 cGy between 8/9/19 - 9/13/19. He underwent a radioactive seed implant of the prostate on 10/16/19 dose 100 cGy after completion of EBRT. Patient tolerated radiation treatment well without developing any grade 3 or higher acute toxicity as a result of his treatment.He did develop some urinary bother for which Flomax was prescribed, taking once a day. \par \par PSA\par 11/21/19  -  1.33\par 5/4/20  -  2.21\par 4/7/21  -  2.02\par \par Patient presents here today for follow up. Taking flomax at night, getting up 5x/night. No rectal bleeding or urgency. Good erections. Failed trial off flomax.

## 2022-03-24 NOTE — PHYSICAL EXAM
[Normal] : Normal sphincter tone, no rectal mass, no prostate nodules, prostate not tender and not enlarged [Normal] : normal external genitalia without lesions and no testicular masses

## 2022-03-25 ENCOUNTER — NON-APPOINTMENT (OUTPATIENT)
Age: 67
End: 2022-03-25

## 2022-03-25 ENCOUNTER — APPOINTMENT (OUTPATIENT)
Dept: OTHER | Facility: CLINIC | Age: 67
End: 2022-03-25
Payer: COMMERCIAL

## 2022-03-25 DIAGNOSIS — Z12.9 ENCOUNTER FOR SCREENING FOR MALIGNANT NEOPLASM, SITE UNSPECIFIED: ICD-10-CM

## 2022-03-25 DIAGNOSIS — Z03.89 ENCOUNTER FOR OBSERVATION FOR OTHER SUSPECTED DISEASES AND CONDITIONS RULED OUT: ICD-10-CM

## 2022-03-25 PROCEDURE — 99443: CPT | Mod: 95

## 2022-03-25 NOTE — PAST MEDICAL HISTORY
[FreeTextEntry1] : patient is a  that was exposed to [facility] dust and debris after 09 11 2001\par \par

## 2022-03-25 NOTE — HISTORY OF PRESENT ILLNESS
[FreeTextEntry1] : pt c/o change in stool consistency, frequent  BMs in the past month \par last colonoscopy 6 years ago - NL \par  no abd pain \par  no blood in the stool \par  no weihgt loss \par HX stage IIC [place] 4+3=7 adenocarcinoma of the prostate. He  was treated with  combination therapy of external beam radiation therapy to a dose of 4500 cGy between 8/9/19 - 9/13/19. He underwent a radioactive seed implant of the prostate on 10/16/19 dose 100 cGy after completion of EBRT.

## 2022-03-25 NOTE — ASSESSMENT
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 He underwent a radioactive seed implant of the prostate on 10/16/19 dose 100 cGy after completion of EBRT. \par  will refer for colonoscopy

## 2022-03-30 LAB — PSA SERPL-MCNC: 0.24 NG/ML

## 2022-05-16 ENCOUNTER — APPOINTMENT (OUTPATIENT)
Dept: GASTROENTEROLOGY | Facility: CLINIC | Age: 67
End: 2022-05-16
Payer: COMMERCIAL

## 2022-05-16 VITALS
DIASTOLIC BLOOD PRESSURE: 84 MMHG | HEIGHT: 67 IN | SYSTOLIC BLOOD PRESSURE: 116 MMHG | OXYGEN SATURATION: 98 % | WEIGHT: 185 LBS | BODY MASS INDEX: 29.03 KG/M2 | HEART RATE: 90 BPM | TEMPERATURE: 97.2 F

## 2022-05-16 DIAGNOSIS — Z12.11 ENCOUNTER FOR SCREENING FOR MALIGNANT NEOPLASM OF COLON: ICD-10-CM

## 2022-05-16 DIAGNOSIS — Z01.818 ENCOUNTER FOR OTHER PREPROCEDURAL EXAMINATION: ICD-10-CM

## 2022-05-16 PROCEDURE — 99204 OFFICE O/P NEW MOD 45 MIN: CPT

## 2022-05-16 NOTE — ASSESSMENT
[FreeTextEntry1] : Colon Cancer screening: I recommend colonoscopy for colon cancer screening over the age of 45 to assess for colonic polyps. The patient was told of the risks and benefits of the procedure.  The patient was told of the risks of perforation, emergency surgery, bleeding, infections and missed lesions. The patient is to be on a clear liquid diet the entire day prior to the procedure. The patient is to complete the entire prescribed bowel prep the day prior to the procedure as directed. The patient is to have a COVID 19 PCR nasopharyngeal swab performed at the approved sites 3 days prior to the procedure.  The patient is told not to drive, drink alcohol, use recreational drugs, exercise, or work the day of the procedure.  The patient was told of the need for an escort to accompany the patient home after the procedure. The patient is aware that the procedure may be cancelled if they fail to follow the directions.  The patient agreed and will schedule for the procedure. The patient is to be n.p.o. after midnight and bowel prep was given.  The patient is to return for the procedure.\par Colonoscopy: I recommend a colonoscopy to assess the symptoms and for colonic polyps.  The patient was told of the risks and benefits of the procedure.  The patient was told of the risks of perforation, emergency surgery, bleeding, infections and missed lesions.  The patient is to be on a clear liquid diet the entire day prior to the procedure. The patient is to complete the entire prescribed bowel prep the day prior to the procedure as directed. The patient is to have a COVID 19 PCR nasopharyngeal swab performed at the approved sites 3 days prior to the procedure.  The patient is told not to drive, drink alcohol, use recreational drugs, exercise, or work the day of the procedure.  The patient was told of the need for an escort to accompany the patient home after the procedure. The patient is aware that the procedure may be cancelled if they fail to follow the directions.  The patient agreed and will schedule for the procedure.  The patient is to be n.p.o. after midnight and bowel prep was given.  The patient is to return for the procedure.\par Follow-up: The patient is to follow-up in the office in 4 weeks to reassess the symptoms. The patient was told to call the office if any further problems. \par

## 2022-05-16 NOTE — REVIEW OF SYSTEMS
[Eyesight Problems] : eyesight problems [Nocturia] : nocturia [Negative] : Heme/Lymph [FreeTextEntry8] : polyuria

## 2022-05-16 NOTE — HISTORY OF PRESENT ILLNESS
[None] : had no significant interval events [Heartburn] : denies heartburn [Nausea] : denies nausea [Vomiting] : denies vomiting [Diarrhea] : denies diarrhea [Constipation] : denies constipation [Yellow Skin Or Eyes (Jaundice)] : denies jaundice [Abdominal Pain] : denies abdominal pain [Abdominal Swelling] : denies abdominal swelling [Rectal Pain] : denies rectal pain [Wt Gain ___ Lbs] : no recent weight gain [Wt Loss ___ Lbs] : no recent weight loss [GERD] : no gastroesophageal reflux disease [Hiatus Hernia] : no hiatus hernia [Peptic Ulcer Disease] : no peptic ulcer disease [Pancreatitis] : no pancreatitis [Cholelithiasis] : no cholelithiasis [Kidney Stone] : no kidney stone [Inflammatory Bowel Disease] : no inflammatory bowel disease [Irritable Bowel Syndrome] : no irritable bowel syndrome [Diverticulitis] : no diverticulitis [Alcohol Abuse] : no alcohol abuse [Malignancy] : no malignancy [Abdominal Surgery] : no abdominal surgery [Appendectomy] : no appendectomy [Cholecystectomy] : no cholecystectomy [de-identified] : The patient is a 66-year-old male with past medical history significant for hypercholesterolemia and prostate cancer who was referred to my office by Dr. Citlaly Landers and [facility] Dr. Valerie Barr for colon cancer screening.   I was asked to render an opinion for consultation for the above complaints.   The patient states that he is feeling fine.  The patient denies any abdominal pain.  The patient denies any abdominal gas and bloating.  The patient denies any nausea or vomiting.  The patient denies any gastroesophageal reflux disease or dysphagia. The patient denies any atypical chest pain, shortness of breath or palpitations.  The patient denies any diaphoresis. The patient denies any constipation or diarrhea.  The patient has 2 bowel movements a day. The patient denies a change in bowel habits.  The patient denies a change in caliber of stool.  The patient denies having mucus discharge with the bowel movements.  The patient denies any bright red blood per rectum, melena or hematemesis.  The patient denies any rectal pain or rectal pruritus. The patient denies any weight loss or anorexia.  He denies any fevers or chills.  The patient denies any jaundice or pruritus.  The patient denies any back pain.  The patient admits to having a prior colonoscopy performed by another gastroenterologist.  According to the patient, the colonoscopic findings was unknown to the patient.   The patient denies any significant family history of GI problems.   [de-identified] : (-) smoking, (-) ETOH, (-) IVDA\par

## 2022-05-17 ENCOUNTER — FORM ENCOUNTER (OUTPATIENT)
Age: 67
End: 2022-05-17

## 2022-05-17 ENCOUNTER — APPOINTMENT (OUTPATIENT)
Dept: OTHER | Facility: CLINIC | Age: 67
End: 2022-05-17
Payer: COMMERCIAL

## 2022-05-17 VITALS
DIASTOLIC BLOOD PRESSURE: 61 MMHG | SYSTOLIC BLOOD PRESSURE: 89 MMHG | HEART RATE: 104 BPM | TEMPERATURE: 98.7 F | BODY MASS INDEX: 29.03 KG/M2 | HEIGHT: 67 IN | WEIGHT: 185 LBS | RESPIRATION RATE: 18 BRPM | OXYGEN SATURATION: 98 %

## 2022-05-17 DIAGNOSIS — H10.10 ACUTE ATOPIC CONJUNCTIVITIS, UNSPECIFIED EYE: ICD-10-CM

## 2022-05-17 DIAGNOSIS — R97.20 ELEVATED PROSTATE, SPECIFIC ANTIGEN [PSA]: ICD-10-CM

## 2022-05-17 PROCEDURE — 99396 PREV VISIT EST AGE 40-64: CPT | Mod: 25

## 2022-05-17 PROCEDURE — 99213 OFFICE O/P EST LOW 20 MIN: CPT | Mod: 25

## 2022-05-17 RX ORDER — TAMSULOSIN HYDROCHLORIDE 0.4 MG/1
0.4 CAPSULE ORAL
Qty: 60 | Refills: 2 | Status: DISCONTINUED | COMMUNITY
Start: 2020-04-15 | End: 2022-05-17

## 2022-05-18 PROBLEM — H10.10 ALLERGIC CONJUNCTIVITIS: Status: ACTIVE | Noted: 2019-04-11

## 2022-05-18 NOTE — DISCUSSION/SUMMARY
[Patient seen for WTC Monitoring ___] : Patient was seen for WTC monitoring [unfilled] [Please See Note in Chart and Documentation in Trial DB] : Please see note in chart and documentation in Trial DB. [FreeTextEntry3] : Exposure Hx: \par [place]  - [place] \par Dates: 09/11/2001 present at the [facility] collapse, \par Searching on 09/11 on the pile for the first 3 days. 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

## 2022-05-18 NOTE — PAST MEDICAL HISTORY
[FreeTextEntry1] : patient is a  that was exposed to [facility] dust and debris after 09 11 2001\par \par [place]

## 2022-05-18 NOTE — HISTORY OF PRESENT ILLNESS
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 He elected combination therapy of external beam radiation therapy to a dose of 4500 cGy between 8/9/19 - 9/13/19. He underwent a radioactive seed implant of the prostate on 10/16/19 dose 100 cGy after completion of EBRT. Patient tolerated radiation treatment well. He did develop some urinary frequency  for which Flomax was prescribed and was effective, he was not able to stop due to Sx recurrence .\par he is also taking Cialis 5 mg daily. \par \par PSA\par 11/21/19 - 1.33\par 5/4/20 - 2.21\par 4/7/21 - 2.02\par 3/24/2022- 0.24\par he is sexually active. denied ED \par \par \par \par \par \par \par \par \par Nasal congestion improved with use of PO Cetirizine  and Azelastine nasal spray , he is using it on and off \par REPORTED NO recent SINUS  infections\par \par Uses albuterol PRN for SOB once/ twice  a week , feels it is effective for asthma \par \par  C/O itchy eyes ad watery eyes \par stated Patanol eye drops help eye Sx  \par \par \par \par Colonoscopy: reported that had a NL 2016\par \par he recently had some increase in bowel frequency and change in consistency \par  he is scheduled for colonoscopy \par \par \par Allergies: only if takes ASA and caffeinated Coffee /face and body rash/ , or shrimp and coffee/ face swelling and itching / with difficulties breathing\par HE IS VERY CAREFUL NOT TO Ingest  ASA AND Shrimp AND DID NOT HAVE ANY Systemic REACTIONS SINCE THEN SINCE 2015\par Soc Hx:  with 5 kids\par \par \par PCP: Pablito Horta \par

## 2022-05-18 NOTE — DISCUSSION/SUMMARY
[Patient seen for WTC Monitoring ___] : Patient was seen for WTC monitoring [unfilled] [Please See Note in Chart and Documentation in Trial DB] : Please see note in chart and documentation in Trial DB. [FreeTextEntry3] : Exposure Hx: \par [place] \par Dates: 09/11/2001 present at the [facility] collapse, \par Searching on 09/11 on the pile for the first 3 days. 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

## 2022-05-18 NOTE — PAST MEDICAL HISTORY
[FreeTextEntry1] : patient is a  that was exposed to [facility] dust and debris after 09 11 2001\par \par [place]: [place]

## 2022-05-18 NOTE — HISTORY OF PRESENT ILLNESS
[FreeTextEntry1] :  \par Quique Boudreaux is a 66 years old male with stage IIC [place] 4+3=7 adenocarcinoma of the prostate. He elected combination therapy of external beam radiation therapy to a dose of 4500 cGy between 8/9/19 - 9/13/19. He underwent a radioactive seed implant of the prostate on 10/16/19 dose 100 cGy after completion of EBRT. Patient tolerated radiation treatment well. He did develop some urinary frequency  for which Flomax was prescribed and was effective, he was not able to stop due to Sx recurrence .\par he is also taking Cialis 5 mg daily. \par \par PSA\par 11/21/19 - 1.33\par 5/4/20 - 2.21\par 4/7/21 - 2.02\par 3/24/2022- 0.24\par he is sexually active. denied ED \par \par \par \par \par \par \par \par \par Nasal congestion improved with use of PO Cetirizine  and Azelastine nasal spray , he is using it on and off \par REPORTED NO recent SINUS  infections\par \par Uses albuterol PRN for SOB once/ twice  a week , feels it is effective for asthma \par \par  C/O itchy eyes ad watery eyes \par stated Patanol eye drops help eye Sx  \par \par \par \par Colonoscopy: reported that had a NL 2016\par \par he recently had some increase in bowel frequency and change in consistency \par  he is scheduled for colonoscopy \par \par \par Allergies: only if takes ASA and caffeinated Coffee /face and body rash/ , or shrimp and coffee/ face swelling and itching / with difficulties breathing\par HE IS VERY CAREFUL NOT TO Ingest  ASA AND Shrimp AND DID NOT HAVE ANY Systemic REACTIONS SINCE THEN SINCE 2015\par Soc Hx:  with 5 kids\par \par \par PCP: Pablito Horta \par

## 2022-05-18 NOTE — ASSESSMENT
[FreeTextEntry1] : prostate cancer \par s/p seed implantation \par \par  he will follow  up with Rad onc as scheduled \par Has urinary SX are  stable. His recent PSA is o.24 ng/m. He will continue taking Flomax and Tadalafil 5mg daily. He is otherwise doing well.\par \par \par \par \par CHRONIC RHINITIS  cr- CONT Antihistamine NASAL SPRAY \par CONT CETIRIZINE PO \par  ASTHMA- MILD INTERMITTENT \par CONT PRN RESCUE INHALER \par \par  allergic conjunctivitis  - eye drops renewed

## 2022-05-20 LAB
ALBUMIN SERPL ELPH-MCNC: 4.6 G/DL
ALP BLD-CCNC: 54 U/L
ALT SERPL-CCNC: 27 U/L
ANION GAP SERPL CALC-SCNC: 14 MMOL/L
AST SERPL-CCNC: 22 U/L
BASOPHILS # BLD AUTO: 0.03 K/UL
BASOPHILS NFR BLD AUTO: 1 %
BILIRUB SERPL-MCNC: 0.3 MG/DL
BUN SERPL-MCNC: 14 MG/DL
CALCIUM SERPL-MCNC: 9.8 MG/DL
CHLORIDE SERPL-SCNC: 102 MMOL/L
CHOLEST SERPL-MCNC: 215 MG/DL
CO2 SERPL-SCNC: 25 MMOL/L
CREAT SERPL-MCNC: 1.48 MG/DL
EGFR: 53 ML/MIN/1.73M2
EOSINOPHIL # BLD AUTO: 0.19 K/UL
EOSINOPHIL NFR BLD AUTO: 6.1 %
GLUCOSE SERPL-MCNC: 121 MG/DL
HCT VFR BLD CALC: 42.8 %
HDLC SERPL-MCNC: 61 MG/DL
HGB BLD-MCNC: 13.7 G/DL
IMM GRANULOCYTES NFR BLD AUTO: 0 %
LDLC SERPL CALC-MCNC: 133 MG/DL
LYMPHOCYTES # BLD AUTO: 1.28 K/UL
LYMPHOCYTES NFR BLD AUTO: 40.9 %
MAN DIFF?: NORMAL
MCHC RBC-ENTMCNC: 30.9 PG
MCHC RBC-ENTMCNC: 32 GM/DL
MCV RBC AUTO: 96.4 FL
MONOCYTES # BLD AUTO: 0.35 K/UL
MONOCYTES NFR BLD AUTO: 11.2 %
NEUTROPHILS # BLD AUTO: 1.28 K/UL
NEUTROPHILS NFR BLD AUTO: 40.8 %
NONHDLC SERPL-MCNC: 154 MG/DL
PLATELET # BLD AUTO: 224 K/UL
POTASSIUM SERPL-SCNC: 4.1 MMOL/L
PROT SERPL-MCNC: 7.4 G/DL
RBC # BLD: 4.44 M/UL
RBC # FLD: 13.2 %
SODIUM SERPL-SCNC: 141 MMOL/L
TRIGL SERPL-MCNC: 109 MG/DL
WBC # FLD AUTO: 3.13 K/UL

## 2022-05-20 RX ORDER — ROSUVASTATIN CALCIUM 10 MG/1
10 TABLET, FILM COATED ORAL
Refills: 0 | Status: ACTIVE | COMMUNITY
Start: 2022-05-20

## 2022-05-20 RX ORDER — POLYETHYLENE GLYCOL 3350 AND ELECTROLYTES WITH LEMON FLAVOR 236; 22.74; 6.74; 5.86; 2.97 G/4L; G/4L; G/4L; G/4L; G/4L
236 POWDER, FOR SOLUTION ORAL
Qty: 1 | Refills: 0 | Status: DISCONTINUED | COMMUNITY
Start: 2022-05-16 | End: 2022-05-20

## 2022-06-02 ENCOUNTER — NON-APPOINTMENT (OUTPATIENT)
Age: 67
End: 2022-06-02

## 2022-08-05 ENCOUNTER — NON-APPOINTMENT (OUTPATIENT)
Age: 67
End: 2022-08-05

## 2022-08-10 ENCOUNTER — FORM ENCOUNTER (OUTPATIENT)
Age: 67
End: 2022-08-10

## 2022-08-21 LAB — SARS-COV-2 N GENE NPH QL NAA+PROBE: NOT DETECTED

## 2022-08-23 ENCOUNTER — RESULT REVIEW (OUTPATIENT)
Age: 67
End: 2022-08-23

## 2022-08-23 ENCOUNTER — APPOINTMENT (OUTPATIENT)
Dept: GASTROENTEROLOGY | Facility: HOSPITAL | Age: 67
End: 2022-08-23

## 2022-08-23 ENCOUNTER — OUTPATIENT (OUTPATIENT)
Dept: OUTPATIENT SERVICES | Facility: HOSPITAL | Age: 67
LOS: 1 days | End: 2022-08-23
Payer: COMMERCIAL

## 2022-08-23 ENCOUNTER — TRANSCRIPTION ENCOUNTER (OUTPATIENT)
Age: 67
End: 2022-08-23

## 2022-08-23 VITALS
DIASTOLIC BLOOD PRESSURE: 71 MMHG | SYSTOLIC BLOOD PRESSURE: 112 MMHG | OXYGEN SATURATION: 100 % | HEART RATE: 66 BPM | RESPIRATION RATE: 16 BRPM

## 2022-08-23 VITALS
DIASTOLIC BLOOD PRESSURE: 99 MMHG | OXYGEN SATURATION: 100 % | SYSTOLIC BLOOD PRESSURE: 142 MMHG | RESPIRATION RATE: 20 BRPM | HEART RATE: 71 BPM | TEMPERATURE: 98 F

## 2022-08-23 DIAGNOSIS — Z12.11 ENCOUNTER FOR SCREENING FOR MALIGNANT NEOPLASM OF COLON: ICD-10-CM

## 2022-08-23 DIAGNOSIS — Z98.890 OTHER SPECIFIED POSTPROCEDURAL STATES: Chronic | ICD-10-CM

## 2022-08-23 DIAGNOSIS — Z92.3 PERSONAL HISTORY OF IRRADIATION: Chronic | ICD-10-CM

## 2022-08-23 PROCEDURE — 88305 TISSUE EXAM BY PATHOLOGIST: CPT

## 2022-08-23 PROCEDURE — 45385 COLONOSCOPY W/LESION REMOVAL: CPT | Mod: PT

## 2022-08-23 PROCEDURE — 88305 TISSUE EXAM BY PATHOLOGIST: CPT | Mod: 26

## 2022-08-23 PROCEDURE — 45385 COLONOSCOPY W/LESION REMOVAL: CPT

## 2022-08-23 RX ORDER — SODIUM CHLORIDE 9 MG/ML
500 INJECTION INTRAMUSCULAR; INTRAVENOUS; SUBCUTANEOUS
Refills: 0 | Status: COMPLETED | OUTPATIENT
Start: 2022-08-23 | End: 2022-08-23

## 2022-08-23 RX ADMIN — SODIUM CHLORIDE 30 MILLILITER(S): 9 INJECTION INTRAMUSCULAR; INTRAVENOUS; SUBCUTANEOUS at 11:12

## 2022-08-23 NOTE — ASU PATIENT PROFILE, ADULT - FALL HARM RISK - HARM RISK INTERVENTIONS

## 2022-08-23 NOTE — ASU DISCHARGE PLAN (ADULT/PEDIATRIC) - NS MD DC FALL RISK RISK
For information on Fall & Injury Prevention, visit: [email or web address] OR  [email or web address] OR  https://www.cdc.gov/steadi/patient.html

## 2022-08-25 ENCOUNTER — NON-APPOINTMENT (OUTPATIENT)
Age: 67
End: 2022-08-25

## 2022-08-25 LAB — SURGICAL PATHOLOGY STUDY: SIGNIFICANT CHANGE UP

## 2022-08-30 ENCOUNTER — FORM ENCOUNTER (OUTPATIENT)
Age: 67
End: 2022-08-30

## 2022-08-30 RX ORDER — TADALAFIL 5 MG/1
5 TABLET ORAL
Qty: 30 | Refills: 3 | Status: DISCONTINUED | COMMUNITY
Start: 2022-03-24 | End: 2022-08-30

## 2022-08-30 RX ORDER — TAMSULOSIN HYDROCHLORIDE 0.4 MG/1
0.4 CAPSULE ORAL
Qty: 90 | Refills: 0 | Status: DISCONTINUED | COMMUNITY
Start: 2020-03-09 | End: 2022-08-30

## 2022-09-06 ENCOUNTER — APPOINTMENT (OUTPATIENT)
Dept: RADIATION ONCOLOGY | Facility: CLINIC | Age: 67
End: 2022-09-06

## 2022-09-06 VITALS
WEIGHT: 187.83 LBS | DIASTOLIC BLOOD PRESSURE: 85 MMHG | RESPIRATION RATE: 17 BRPM | OXYGEN SATURATION: 100 % | SYSTOLIC BLOOD PRESSURE: 119 MMHG | TEMPERATURE: 97 F | HEART RATE: 69 BPM | BODY MASS INDEX: 29.42 KG/M2

## 2022-09-06 PROCEDURE — 99213 OFFICE O/P EST LOW 20 MIN: CPT

## 2022-09-06 NOTE — REVIEW OF SYSTEMS
[Negative] : Endocrine [Hematuria: Grade 0] : Hematuria: Grade 0 [Urinary Incontinence: Grade 0] : Urinary Incontinence: Grade 0  [Urinary Retention: Grade 0] : Urinary Retention: Grade 0 [Urinary Tract Pain: Grade 0] : Urinary Tract Pain: Grade 0 [Urinary Urgency: Grade 0] : Urinary Urgency: Grade 0 [Ejaculation Disorder: Grade 0] : Ejaculation Disorder: Grade 0 [Erectile Dysfunction: Grade 0] : Erectile Dysfunction: Grade 0 [Nocturia] : nocturia [IPSS Score (0-40): ___] : IPSS score: [unfilled] [EPIC-CP Score (0-60): ___] : EPIC-CP score: [unfilled] [Urinary Frequency: Grade 0] : Urinary Frequency: Grade 0 [Urinary Frequency] : no urinary frequency

## 2022-09-06 NOTE — HISTORY OF PRESENT ILLNESS
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 He elected combination therapy of external beam radiation therapy to a dose of 4500 cGy between 8/9/19 - 9/13/19. He underwent a radioactive seed implant of the prostate on 10/16/19 dose 100 cGy after completion of EBRT. Patient tolerated radiation treatment well without developing any grade 3 or higher acute toxicity as a result of his treatment.He did develop some urinary bother for which Flomax was prescribed, taking once a day. \par \par PSA\par 11/21/19  -  1.33\par 5/4/20  -  2.21\par 4/7/21  -  2.02\par 3/24/22 - 0.24\par \par Patient presents here today for follow up. Patient report nocturia 4-5 times per night , occasional dribbling, use one pad per day. He denies dysuria, hematuria, urgency, hesitancy, and frequency. Patient has colonoscopy recent which shows hemorrhoid developing. Will draw a new PSA today.

## 2022-09-13 LAB — PSA SERPL-MCNC: 0.1 NG/ML

## 2022-09-26 ENCOUNTER — APPOINTMENT (OUTPATIENT)
Dept: GASTROENTEROLOGY | Facility: CLINIC | Age: 67
End: 2022-09-26

## 2022-09-26 VITALS
HEIGHT: 67 IN | BODY MASS INDEX: 29.35 KG/M2 | OXYGEN SATURATION: 97 % | WEIGHT: 187 LBS | HEART RATE: 85 BPM | SYSTOLIC BLOOD PRESSURE: 131 MMHG | TEMPERATURE: 98.6 F | DIASTOLIC BLOOD PRESSURE: 77 MMHG

## 2022-09-26 DIAGNOSIS — K64.8 OTHER HEMORRHOIDS: ICD-10-CM

## 2022-09-26 DIAGNOSIS — K63.5 POLYP OF COLON: ICD-10-CM

## 2022-09-26 PROCEDURE — 99213 OFFICE O/P EST LOW 20 MIN: CPT

## 2022-09-26 RX ORDER — HYDROCORTISONE 25 MG/G
2.5 CREAM TOPICAL
Qty: 2 | Refills: 3 | Status: ACTIVE | COMMUNITY
Start: 2022-09-26 | End: 1900-01-01

## 2022-09-26 NOTE — HISTORY OF PRESENT ILLNESS
[FreeTextEntry1] :  The colonoscopy to the terminal ileum performed at the [facility] [place] GI endoscopy suite on August 23, 2022 revealed an ascending colon polyp, a descending colon polyp and small internal hemorrhoids.  The colonic polyps were snared and removed. There was no bleeding post polypectomy. There were no other polyps, masses, diverticulosis, AVMs or colitis noted.  The colonic pathology performed on August 23, 2022 revealed benign colonic mucosa with focal hyperplastic changes (ascending colon polyp at 80 cm) and tubular adenoma (descending colon polyp at 50 cm).

## 2022-09-26 NOTE — ASSESSMENT
[FreeTextEntry1] : Colonic Polyp: The patient was found to have colonic polyps on prior colonoscopy.  I recommend a repeat colonoscopy in 5 years to reassess for colonic polyps pending patient’s health unless symptomatic.  The patient agreed and will follow up for the procedure. \par Internal Hemorrhoids: The patient is to consider starting a trial of Anusol H. C. suppositories one per rectum nightly and Anusol HC2 .5% cream apply to affected area twice a day p.r.n. hemorrhoidal bleeding or pain. I also recommend a trial of Calmoseptine cream apply to affected area twice a day for hemorrhoidal discomfort.  I recommend Tucks pads for the hemorrhoids.  I recommend Sitz baths twice a day for the hemorrhoids.  I recommend avoid wearing tight underwear and use boxers.  I recommend avoid touching the perineal area.  The patient agreed to followup. \par Follow-up: The patient is to follow-up in the office in 1 year to reassess the symptoms. The patient was told to call the office if any further problems.\par

## 2022-10-06 ENCOUNTER — APPOINTMENT (OUTPATIENT)
Dept: OTOLARYNGOLOGY | Facility: CLINIC | Age: 67
End: 2022-10-06

## 2022-10-06 VITALS
DIASTOLIC BLOOD PRESSURE: 74 MMHG | HEIGHT: 67 IN | HEART RATE: 79 BPM | TEMPERATURE: 98.3 F | WEIGHT: 185 LBS | SYSTOLIC BLOOD PRESSURE: 114 MMHG | BODY MASS INDEX: 29.03 KG/M2

## 2022-10-06 DIAGNOSIS — J34.2 DEVIATED NASAL SEPTUM: ICD-10-CM

## 2022-10-06 DIAGNOSIS — J30.2 OTHER SEASONAL ALLERGIC RHINITIS: ICD-10-CM

## 2022-10-06 DIAGNOSIS — R09.81 NASAL CONGESTION: ICD-10-CM

## 2022-10-06 PROCEDURE — 31231 NASAL ENDOSCOPY DX: CPT

## 2022-10-06 PROCEDURE — 99204 OFFICE O/P NEW MOD 45 MIN: CPT | Mod: 25

## 2022-10-06 NOTE — HISTORY OF PRESENT ILLNESS
[de-identified] : Patient is [facility] referred for nasal issues. He has been having years of nasal congestion and  runny nose all the time. He has seasonal allergies and uses Zyrtec. He does not have any recent history of sinus infections and has not needed any antibiotics recently for nasal issues.  he does state that he get a lot of sneezing and runny nose especially during the season change. \par He does not snore at night or have sleep apnea. \par He has used nasal sprays in the past with no benefit recently to his nasal congestion

## 2022-10-06 NOTE — ASSESSMENT
[FreeTextEntry1] : Referred for evaluation of nasal congestion on examination endoscopically has deviated nasal septum to the right swollen turbinates put him on Flonase nasal spray to see if that will help him follow-up and see us in a month if there is no improvement consideration for surgery to correct his symptoms will be considered.

## 2022-10-06 NOTE — END OF VISIT
[FreeTextEntry3] : I, Dr. Santos personally performed the evaluation and management (E/M) services including all necessary procedures, for this new patient. That E/M includes conducting the clinically appropriate initial history &/or exam, assessing all conditions, and establishing the plan of care. Today, my DARRICK, Roxie Kwong, was here to observe &/or participate in the visit & follow plan of care established by me.\par \par \par

## 2022-10-06 NOTE — CONSULT LETTER
[Dear  ___] : Dear  [unfilled], [Consult Letter:] : I had the pleasure of evaluating your patient, [unfilled]. [Please see my note below.] : Please see my note below. [Consult Closing:] : Thank you very much for allowing me to participate in the care of this patient.  If you have any questions, please do not hesitate to contact me. [Sincerely,] : Sincerely, [FreeTextEntry3] : Satya Santos MD\par [facility]\par Otolaryngology and Facial Plastics\par Associated Professor, [employer]\par

## 2023-05-04 ENCOUNTER — FORM ENCOUNTER (OUTPATIENT)
Age: 68
End: 2023-05-04

## 2023-05-08 LAB — PSA SERPL-MCNC: 0.07 NG/ML

## 2023-05-09 ENCOUNTER — APPOINTMENT (OUTPATIENT)
Dept: RADIATION ONCOLOGY | Facility: CLINIC | Age: 68
End: 2023-05-09
Payer: COMMERCIAL

## 2023-05-09 VITALS
OXYGEN SATURATION: 97 % | HEIGHT: 67 IN | RESPIRATION RATE: 17 BRPM | DIASTOLIC BLOOD PRESSURE: 82 MMHG | WEIGHT: 193.56 LBS | SYSTOLIC BLOOD PRESSURE: 127 MMHG | HEART RATE: 90 BPM | BODY MASS INDEX: 30.38 KG/M2 | TEMPERATURE: 97.7 F

## 2023-05-09 PROCEDURE — 99214 OFFICE O/P EST MOD 30 MIN: CPT

## 2023-05-09 NOTE — DISEASE MANAGEMENT
[1] : T1 [c] : c [0] : M0 [0-10] : 0 -10 ng/mL [] : Patient had a Prostate MRI [3] : 3 [IIC] : IIC [Radiation Therapy] : Radiation Therapy [Treatment with radiation therapy] : Treatment with radiation therapy [EBRT] : EBRT [LDR] : LDR [Treatment with androgen ablation] : Treatment with androgen ablation [TotalCores] : 15 [TotalPositiveCores] : 11 [MaxCoreInvolvement] : 70% [RadiationCompletedDate] : 10/16/19 [EBRTDose] : 2103 [EBRTFractions] : 25 [LDRDose] : 100

## 2023-05-09 NOTE — HISTORY OF PRESENT ILLNESS
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 He elected combination therapy of external beam radiation therapy to a dose of 4500 cGy between 8/9/19 - 9/13/19. He underwent a radioactive seed implant of the prostate on 10/16/19 dose 100 cGy after completion of EBRT. Patient tolerated radiation treatment well without developing any grade 3 or higher acute toxicity as a result of his treatment.He did develop some urinary bother for which Flomax was prescribed, taking once a day. \par \par PSA\par 11/21/19  -  1.33\par 5/4/20  -  2.21\par 4/7/21  -  2.02\par 3/24/22 - 0.24\par 9/6/22   - 0.10\par 5/8/23   - 0.0.7\par \par Patient presents here today for follow up. He report nocturia 3-4 times per night, weak flow, occasional dribbling, and frequency. He denies dysuria, hematuria, and hesitancy. He endorsed regular bowel function and he is sexually active.

## 2023-05-18 ENCOUNTER — APPOINTMENT (OUTPATIENT)
Dept: OTHER | Facility: CLINIC | Age: 68
End: 2023-05-18
Payer: COMMERCIAL

## 2023-05-18 VITALS
HEIGHT: 67 IN | OXYGEN SATURATION: 98 % | SYSTOLIC BLOOD PRESSURE: 108 MMHG | TEMPERATURE: 208.22 F | BODY MASS INDEX: 29.03 KG/M2 | RESPIRATION RATE: 18 BRPM | WEIGHT: 185 LBS | HEART RATE: 87 BPM | DIASTOLIC BLOOD PRESSURE: 75 MMHG

## 2023-05-18 PROCEDURE — 94010 BREATHING CAPACITY TEST: CPT

## 2023-05-18 PROCEDURE — 99397 PER PM REEVAL EST PAT 65+ YR: CPT | Mod: 25

## 2023-05-18 PROCEDURE — 99214 OFFICE O/P EST MOD 30 MIN: CPT | Mod: 25

## 2023-05-18 RX ORDER — AZELASTINE HYDROCHLORIDE 137 UG/1
137 SPRAY, METERED NASAL
Qty: 1 | Refills: 1 | Status: DISCONTINUED | COMMUNITY
Start: 2019-04-11 | End: 2023-05-18

## 2023-05-18 RX ORDER — AZELASTINE HYDROCHLORIDE 137 UG/1
0.1 SPRAY, METERED NASAL
Qty: 30 | Refills: 1 | Status: DISCONTINUED | COMMUNITY
Start: 2021-09-12 | End: 2023-05-18

## 2023-05-18 NOTE — DISCUSSION/SUMMARY
[Patient seen for WTC Monitoring ___] : Patient was seen for WTC monitoring [unfilled] [Please See Note in Chart and Documentation in Trial DB] : Please see note in chart and documentation in Trial DB. [FreeTextEntry3] : [facility] dust Exposure Hx: \par [place] \par Dates: 09/11/2001 present at the [employer] collapse, \par Searching on 09/11 on the pile for the first 3 days. 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

## 2023-05-18 NOTE — PAST MEDICAL HISTORY
[FreeTextEntry1] : patient is a  that was exposed to [facility] dust and debris after 09 11 2001\par \par [place]

## 2023-05-18 NOTE — PAST MEDICAL HISTORY
[FreeTextEntry1] : patient is a  that was exposed to [facility] dust and debris after 09 11 2001\par \par [place]

## 2023-05-18 NOTE — DISCUSSION/SUMMARY
[Patient seen for WTC Monitoring ___] : Patient was seen for WTC monitoring [unfilled] [Please See Note in Chart and Documentation in Trial DB] : Please see note in chart and documentation in Trial DB. [FreeTextEntry3] : [facility] dust Exposure Hx: \par [place] \par Dates: 09/11/2001 present at the [facility] collapse, \par Searching on 09/11 on the pile for the first 3 days. 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

## 2023-05-18 NOTE — REASON FOR VISIT
[Follow-Up] : a follow-up visit [FreeTextEntry1] : RADS, Rhinitis , CAP- certified as WTC relate by [employer]

## 2023-05-18 NOTE — HISTORY OF PRESENT ILLNESS
[FreeTextEntry1] :  \par Quique Boudreaux is a 67 years old male with stage IIC [place] 4+3=7 adenocarcinoma of the prostate. He elected combination therapy of external beam radiation therapy to a dose of 4500 cGy between 8/9/19 - 9/13/19. He underwent a radioactive seed implant of the prostate on 10/16/19 dose 100 cGy after completion of EBRT. Patient tolerated radiation treatment well. He did develop some urinary frequency  for which Flomax was prescribed and was effective, he was not able to stop due to Sx recurrence .\par \par \par \par PSA\par 11/21/19 - 1.33\par 5/4/20 - 2.21\par 4/7/21 - 2.02\par 3/24/2022- 0.24\par 05/08/2023-0.07\par he is sexually active. denied ED \par \par \par He has been having years of nasal congestion and runny nose all the time. He has seasonal allergies and uses Zyrtec. He does not have any recent history of sinus infections and has not needed any antibiotics recently for nasal issues. he does state that he get a lot of sneezing and runny nose especially during the season change. \par He does not snore at night or have sleep apnea. \par He has used nasal sprays in the past with no benefit recently to his nasal congestion \par \par \par \par \par \par \par Nasal congestion improved with use of PO Cetirizine  and Azelastine nasal spray , he is using it on and off \par REPORTED NO recent SINUS  infections\par \par Uses albuterol PRN for SOB once/ twice  a week , feels it is effective for asthma \par \par  C/O itchy eyes ad watery eyes \par stated Patanol eye drops help eye Sx  \par \par \par \par Colonoscopy: reported that had a NL 2016\par \par he recently had some increase in bowel frequency and change in consistency \par  he is scheduled for colonoscopy \par \par asthma- \par  using albuterol as a rescue inhaler PRN once a week on average \par \par Allergies: only if takes ASA and caffeinated Coffee /face and body rash/ , or shrimp and coffee/ face swelling and itching / with difficulties breathing\par HE IS VERY CAREFUL NOT TO Ingest  ASA AND Shrimp AND DID NOT HAVE ANY Systemic REACTIONS SINCE THEN SINCE 2015\par Soc Hx:  with 5 kids\par \par \par

## 2023-05-18 NOTE — ASSESSMENT
[FreeTextEntry1] : prostate cancer \par s/p seed implantation \par \par Rad oncologist note reviewed \par \par Has urinary SX are  stable but has to take Flomax. His recent PSA is o.07 ng/m. He will continue taking Flomax. \par \par \par \par \par CHRONIC RHINITIS  cr- CONT Flonase  NASAL SPRAY \par CONT CETIRIZINE PO \par  ASTHMA- MILD INTERMITTENT \par CONT PRN albuterol  RESCUE INHALER \par

## 2023-05-19 LAB
ALBUMIN SERPL ELPH-MCNC: 4.5 G/DL
ALP BLD-CCNC: 60 U/L
ALT SERPL-CCNC: 28 U/L
ANION GAP SERPL CALC-SCNC: 13 MMOL/L
APPEARANCE: CLEAR
AST SERPL-CCNC: 25 U/L
BACTERIA: NEGATIVE /HPF
BILIRUB SERPL-MCNC: 0.3 MG/DL
BILIRUBIN URINE: NEGATIVE
BLOOD URINE: NEGATIVE
BUN SERPL-MCNC: 13 MG/DL
CALCIUM SERPL-MCNC: 10.2 MG/DL
CAST: 16 /LPF
CHLORIDE SERPL-SCNC: 103 MMOL/L
CHOLEST SERPL-MCNC: 227 MG/DL
CO2 SERPL-SCNC: 24 MMOL/L
COLOR: YELLOW
CREAT SERPL-MCNC: 1.36 MG/DL
EGFR: 58 ML/MIN/1.73M2
EPITHELIAL CELLS: 1 /HPF
FINE GRANULAR CASTS: PRESENT
GLUCOSE QUALITATIVE U: NEGATIVE MG/DL
GLUCOSE SERPL-MCNC: 98 MG/DL
HDLC SERPL-MCNC: 75 MG/DL
HYALINE CASTS: PRESENT
KETONES URINE: ABNORMAL MG/DL
LDLC SERPL CALC-MCNC: 142 MG/DL
LEUKOCYTE ESTERASE URINE: ABNORMAL
MICROSCOPIC-UA: NORMAL
NITRITE URINE: NEGATIVE
NONHDLC SERPL-MCNC: 153 MG/DL
PH URINE: 6.5
POTASSIUM SERPL-SCNC: 4.4 MMOL/L
PROT SERPL-MCNC: 7.6 G/DL
PROTEIN URINE: 30 MG/DL
RED BLOOD CELLS URINE: 4 /HPF
REVIEW: NORMAL
SODIUM SERPL-SCNC: 141 MMOL/L
SPECIFIC GRAVITY URINE: 1.03
TRIGL SERPL-MCNC: 52 MG/DL
UROBILINOGEN URINE: 1 MG/DL
WHITE BLOOD CELLS URINE: 1 /HPF

## 2023-09-28 RX ORDER — TAMSULOSIN HYDROCHLORIDE 0.4 MG/1
0.4 CAPSULE ORAL
Qty: 90 | Refills: 3 | Status: ACTIVE | COMMUNITY
Start: 2022-08-30 | End: 1900-01-01

## 2023-10-12 RX ORDER — CETIRIZINE HYDROCHLORIDE 10 MG/1
10 TABLET, COATED ORAL
Qty: 30 | Refills: 1 | Status: ACTIVE | COMMUNITY
Start: 2023-05-18 | End: 1900-01-01

## 2023-11-11 LAB — PSA SERPL-MCNC: 0.05 NG/ML

## 2023-11-14 ENCOUNTER — APPOINTMENT (OUTPATIENT)
Dept: RADIATION ONCOLOGY | Facility: CLINIC | Age: 68
End: 2023-11-14
Payer: COMMERCIAL

## 2023-11-14 VITALS
DIASTOLIC BLOOD PRESSURE: 81 MMHG | RESPIRATION RATE: 17 BRPM | HEIGHT: 67 IN | WEIGHT: 193.01 LBS | TEMPERATURE: 96.6 F | OXYGEN SATURATION: 98 % | SYSTOLIC BLOOD PRESSURE: 125 MMHG | BODY MASS INDEX: 30.29 KG/M2 | HEART RATE: 89 BPM

## 2023-11-14 PROCEDURE — 99214 OFFICE O/P EST MOD 30 MIN: CPT

## 2023-12-15 ENCOUNTER — NON-APPOINTMENT (OUTPATIENT)
Age: 68
End: 2023-12-15

## 2024-01-25 NOTE — ASU PREOP CHECKLIST - SURGICAL CONSENT
Increase propranolol from 80 mg to 160 mg daily.  Please supplement 80 mg tablets currently taking to equal 160 daily.   prostate biopsy done/prostate biopsy

## 2024-03-18 RX ORDER — TAMSULOSIN HYDROCHLORIDE 0.4 MG/1
0.4 CAPSULE ORAL
Qty: 90 | Refills: 2 | Status: ACTIVE | COMMUNITY
Start: 2019-11-21 | End: 1900-01-01

## 2024-04-09 NOTE — H&P PST ADULT - VENOUS THROMBOEMBOLISM FOR WOMEN ONLY
Quality 226: Preventive Care And Screening: Tobacco Use: Screening And Cessation Intervention: Patient screened for tobacco use and is an ex/non-smoker Quality 130: Documentation Of Current Medications In The Medical Record: Current Medications Documented Detail Level: Detailed (0) indicator not present

## 2024-05-02 ENCOUNTER — NON-APPOINTMENT (OUTPATIENT)
Age: 69
End: 2024-05-02

## 2024-05-10 LAB — PSA SERPL-MCNC: 0.03 NG/ML

## 2024-05-15 ENCOUNTER — APPOINTMENT (OUTPATIENT)
Dept: RADIATION ONCOLOGY | Facility: CLINIC | Age: 69
End: 2024-05-15
Payer: COMMERCIAL

## 2024-05-15 VITALS
WEIGHT: 191.69 LBS | SYSTOLIC BLOOD PRESSURE: 136 MMHG | BODY MASS INDEX: 30.09 KG/M2 | RESPIRATION RATE: 17 BRPM | DIASTOLIC BLOOD PRESSURE: 82 MMHG | OXYGEN SATURATION: 98 % | TEMPERATURE: 97 F | HEART RATE: 99 BPM | HEIGHT: 67 IN

## 2024-05-15 PROCEDURE — 99214 OFFICE O/P EST MOD 30 MIN: CPT

## 2024-05-15 NOTE — DISEASE MANAGEMENT
[1] : T1 [c] : c [0] : M0 [0-10] : 0 -10 ng/mL [] : Patient had a Prostate MRI [3] : 3 [IIC] : IIC [Radiation Therapy] : Radiation Therapy [Treatment with radiation therapy] : Treatment with radiation therapy [EBRT] : EBRT [LDR] : LDR [Treatment with androgen ablation] : Treatment with androgen ablation [TotalCores] : 15 [TotalPositiveCores] : 11 [MaxCoreInvolvement] : 70% [RadiationCompletedDate] : 10/16/19 [EBRTDose] : 0036 [EBRTFractions] : 25 [LDRDose] : 100

## 2024-05-15 NOTE — REVIEW OF SYSTEMS
[IPSS Score (0-40): ___] : IPSS score: [unfilled] [EPIC-CP Score (0-60): ___] : EPIC-CP score: [unfilled] [Negative] : Endocrine [Hematuria: Grade 0] : Hematuria: Grade 0 [Urinary Incontinence: Grade 0] : Urinary Incontinence: Grade 0  [Urinary Retention: Grade 0] : Urinary Retention: Grade 0 [Urinary Tract Pain: Grade 0] : Urinary Tract Pain: Grade 0 [Urinary Urgency: Grade 0] : Urinary Urgency: Grade 0 [Urinary Frequency: Grade 0] : Urinary Frequency: Grade 0 [Ejaculation Disorder: Grade 0] : Ejaculation Disorder: Grade 0 [Erectile Dysfunction: Grade 0] : Erectile Dysfunction: Grade 0 [Nocturia] : nocturia [Urinary Frequency] : no urinary frequency

## 2024-05-15 NOTE — HISTORY OF PRESENT ILLNESS
[FreeTextEntry1] : Quique Boudreaux is a 68 years old male with stage IIC [place] 4+3=7 adenocarcinoma of the prostate. He elected combination therapy of external beam radiation therapy to a dose of 4500 cGy between 8/9/19 - 9/13/19. He underwent a radioactive seed implant of the prostate on 10/16/19 dose 100 cGy after completion of EBRT. Patient tolerated radiation treatment well without developing any grade 3 or higher acute toxicity as a result of his treatment.He did develop some urinary bother for which Flomax was prescribed, taking once a day.   PSA 11/21/19  -  1.33 5/4/20  -  2.21 4/7/21  -  2.02 3/24/22 - 0.24 9/6/22   - 0.10 5/8/23   - 0.0.7 11/11/23 - 0.05 5/10/24 - 0.03  Patient presents here today for follow up. Patient is mixed with his urinary quality of life. Patient reports nocturia three times per night, occasional dribbling, frequency and weak stream. He is currently taking Flomx 0.4 mg once daily. Patient is sexually active with Tadalafil and no bowel issues.

## 2024-06-06 ENCOUNTER — APPOINTMENT (OUTPATIENT)
Dept: RADIOLOGY | Facility: CLINIC | Age: 69
End: 2024-06-06
Payer: COMMERCIAL

## 2024-06-06 ENCOUNTER — APPOINTMENT (OUTPATIENT)
Dept: OTHER | Facility: CLINIC | Age: 69
End: 2024-06-06
Payer: COMMERCIAL

## 2024-06-06 VITALS
TEMPERATURE: 98.2 F | BODY MASS INDEX: 29.51 KG/M2 | OXYGEN SATURATION: 97 % | SYSTOLIC BLOOD PRESSURE: 121 MMHG | WEIGHT: 188 LBS | HEART RATE: 73 BPM | HEIGHT: 67 IN | DIASTOLIC BLOOD PRESSURE: 79 MMHG

## 2024-06-06 DIAGNOSIS — Z04.9 ENCOUNTER FOR EXAMINATION AND OBSERVATION FOR UNSPECIFIED REASON: ICD-10-CM

## 2024-06-06 DIAGNOSIS — J68.4 CHRONIC RESPIRATORY CONDITIONS DUE TO CHEMICALS, GASES, FUMES AND VAPORS: ICD-10-CM

## 2024-06-06 DIAGNOSIS — C61 MALIGNANT NEOPLASM OF PROSTATE: ICD-10-CM

## 2024-06-06 DIAGNOSIS — J31.0 CHRONIC RHINITIS: ICD-10-CM

## 2024-06-06 PROCEDURE — 99214 OFFICE O/P EST MOD 30 MIN: CPT | Mod: 25

## 2024-06-06 PROCEDURE — 99397 PER PM REEVAL EST PAT 65+ YR: CPT | Mod: 25

## 2024-06-06 PROCEDURE — 71046 X-RAY EXAM CHEST 2 VIEWS: CPT

## 2024-06-06 PROCEDURE — 94010 BREATHING CAPACITY TEST: CPT

## 2024-06-06 RX ORDER — FLUTICASONE PROPIONATE 50 UG/1
50 SPRAY, METERED NASAL DAILY
Qty: 1 | Refills: 3 | Status: ACTIVE | COMMUNITY
Start: 2022-10-06 | End: 1900-01-01

## 2024-06-06 RX ORDER — ALBUTEROL SULFATE 90 UG/1
108 (90 BASE) INHALANT RESPIRATORY (INHALATION)
Qty: 1 | Refills: 1 | Status: ACTIVE | COMMUNITY
Start: 2021-07-06 | End: 1900-01-01

## 2024-06-06 RX ORDER — CETIRIZINE HYDROCHLORIDE 10 MG/1
10 TABLET, COATED ORAL
Qty: 30 | Refills: 5 | Status: ACTIVE | COMMUNITY
Start: 2019-09-09 | End: 1900-01-01

## 2024-06-06 NOTE — PAST MEDICAL HISTORY
[FreeTextEntry1] : patient is a  that was exposed to [facility] dust and debris after 09 11 2001\par  \par  [place]

## 2024-06-06 NOTE — DISCUSSION/SUMMARY
[Patient seen for WTC Monitoring ___] : Patient was seen for WTC monitoring [unfilled] [Please See Note in Chart and Documentation in Trial DB] : Please see note in chart and documentation in Trial DB. [FreeTextEntry3] : [facility] dust Exposure Hx:  [place]  - [place] since 2017  Dates: 09/11/2001 present at the [facility] collapse,  Searching on 09/11 on the pile for the first 3 days. 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

## 2024-06-06 NOTE — ASSESSMENT
[FreeTextEntry1] : prostate cancer  s/p seed implantation   Rad oncologist note reviewed   Has urinary SX are  stable but has to take Flomax. His recent PSA is 0.07 ng/m. He will continue taking Flomax.      CHRONIC RHINITIS  cr- CONT Flonase  NASAL SPRAY  CONT CETIRIZINE PO   ASTHMA- MILD INTERMITTENT  CONT PRN albuterol  RESCUE INHALER    patient lisa paperwork for his [employer] pension and i filled for him

## 2024-06-06 NOTE — PAST MEDICAL HISTORY
[FreeTextEntry1] : patient is a  that was exposed to [facility] dust and debris after 09 11 2001\par  \par  [place]

## 2024-06-06 NOTE — ASSESSMENT
[FreeTextEntry1] : prostate cancer  s/p seed implantation   Rad oncologist note reviewed   Has urinary SX are  stable but has to take Flomax. His recent PSA is 0.07 ng/m. He will continue taking Flomax.      CHRONIC RHINITIS  cr- CONT Flonase  NASAL SPRAY  CONT CETIRIZINE PO   ASTHMA- MILD INTERMITTENT  CONT PRN albuterol  RESCUE INHALER    patient lisa paperwork for his [employer] pension and i filled for him

## 2024-06-06 NOTE — HISTORY OF PRESENT ILLNESS
[FreeTextEntry1] :   Quique Boudreaux is a 68 years old male with stage IIC [place] 4+3=7 adenocarcinoma of the prostate. He elected combination therapy of external beam radiation therapy to a dose of 4500 cGy between 8/9/19 - 9/13/19. He underwent a radioactive seed implant of the prostate on 10/16/19 dose 100 cGy after completion of EBRT. Patient tolerated radiation treatment well. He did develop some urinary frequency  for which Flomax was prescribed and was effective, he was not able to stop due to Sx recurrence .   PSA 11/21/19 - 1.33 5/4/20 - 2.21 4/7/21 - 2.02 3/24/22 - 0.24 9/6/22 - 0.10 5/8/23 - 0.0.7 11/11/23 - 0.05 5/10/24 - 0.03  he is sexually active. denied ED    He has been having years of nasal congestion and runny nose all the time. He has seasonal allergies and uses Zyrtec. He does not have any recent history of sinus infections and has not needed any antibiotics recently for nasal issues. he does state that he get a lot of sneezing and runny nose especially during the season change.  He does not snore at night or have sleep apnea.  He has used nasal sprays in the past with no benefit recently to his nasal congestion    asthma-uses albuterol as needed occasionally     Nasal congestion improved with use of PO Cetirizine  and Azelastine nasal spray , he is using it on and off  REPORTED NO recent SINUS  infections  Uses albuterol PRN for SOB once/ twice  a week , feels it is effective for asthma    C/O itchy eyes ad watery eyes  stated Patanol eye drops help eye Sx      Colonoscopy: reported that had a NL 2016  he recently had some increase in bowel frequency and change in consistency   he is scheduled for colonoscopy   asthma-   using albuterol as a rescue inhaler PRN once a week on average   Allergies: only if takes ASA and caffeinated Coffee /face and body rash/ , or shrimp and coffee/ face swelling and itching / with difficulties breathing HE IS VERY CAREFUL NOT TO Ingest  ASA AND Shrimp AND DID NOT HAVE ANY Systemic REACTIONS SINCE THEN SINCE 2015 Soc Hx:  with 5 kids

## 2024-06-07 LAB
ALBUMIN SERPL ELPH-MCNC: 4.6 G/DL
ALP BLD-CCNC: 54 U/L
ALT SERPL-CCNC: 25 U/L
ANION GAP SERPL CALC-SCNC: 11 MMOL/L
AST SERPL-CCNC: 32 U/L
BILIRUB SERPL-MCNC: 0.4 MG/DL
BUN SERPL-MCNC: 16 MG/DL
CALCIUM SERPL-MCNC: 9.8 MG/DL
CHLORIDE SERPL-SCNC: 104 MMOL/L
CHOLEST SERPL-MCNC: 234 MG/DL
CO2 SERPL-SCNC: 25 MMOL/L
CREAT SERPL-MCNC: 1.32 MG/DL
EGFR: 59 ML/MIN/1.73M2
GLUCOSE SERPL-MCNC: 92 MG/DL
HCT VFR BLD CALC: 41.5 %
HDLC SERPL-MCNC: 73 MG/DL
HGB BLD-MCNC: 13.4 G/DL
LDLC SERPL CALC-MCNC: 151 MG/DL
MCHC RBC-ENTMCNC: 31.2 PG
MCHC RBC-ENTMCNC: 32.3 GM/DL
MCV RBC AUTO: 96.7 FL
NONHDLC SERPL-MCNC: 162 MG/DL
PLATELET # BLD AUTO: 256 K/UL
POTASSIUM SERPL-SCNC: 4.8 MMOL/L
PROT SERPL-MCNC: 7.7 G/DL
RBC # BLD: 4.29 M/UL
RBC # FLD: 13.3 %
SODIUM SERPL-SCNC: 140 MMOL/L
TRIGL SERPL-MCNC: 61 MG/DL
WBC # FLD AUTO: 4.03 K/UL

## 2024-06-11 RX ORDER — TADALAFIL 5 MG/1
5 TABLET ORAL
Qty: 30 | Refills: 3 | Status: ACTIVE | COMMUNITY
Start: 2022-08-30 | End: 1900-01-01

## 2024-06-11 RX ORDER — TAMSULOSIN HYDROCHLORIDE 0.4 MG/1
0.4 CAPSULE ORAL
Qty: 90 | Refills: 3 | Status: ACTIVE | COMMUNITY
Start: 2023-05-09 | End: 1900-01-01

## 2024-07-30 NOTE — ASU PATIENT PROFILE, ADULT - NSALCOHOLTYPE_GEN__A_CORE_SD
It was great to see you and Joseph in clinic today! Continue the Lansoprazole, we will follow up at her one year visit to see how things are doing and if she needs to continue this medication further.     We have a nurse advice line 24/7- just call us at [phone]. We also have daily sick visits (same day sick visit) and walk in clinic M-F. Use the same phone number for all. Please let us help you avoid using the Emergency Room if there is not an emergency! We want to talk with you about your child.    Important Phone Numbers:   Poison Control: [phone]  24/7 Nurse Line: [phone]    beer

## 2024-09-19 RX ORDER — TAMSULOSIN HYDROCHLORIDE 0.4 MG/1
0.4 CAPSULE ORAL
Qty: 90 | Refills: 3 | Status: ACTIVE | COMMUNITY
Start: 2024-09-19 | End: 1900-01-01

## 2024-09-24 RX ORDER — TADALAFIL 5 MG/1
5 TABLET ORAL
Qty: 90 | Refills: 0 | Status: ACTIVE | COMMUNITY
Start: 2024-09-24 | End: 1900-01-01

## 2024-09-24 RX ORDER — TAMSULOSIN HYDROCHLORIDE 0.4 MG/1
0.4 CAPSULE ORAL
Qty: 30 | Refills: 5 | Status: ACTIVE | COMMUNITY
Start: 2024-09-24 | End: 1900-01-01

## 2025-01-29 ENCOUNTER — APPOINTMENT (OUTPATIENT)
Dept: RADIATION ONCOLOGY | Facility: CLINIC | Age: 70
End: 2025-01-29
Payer: COMMERCIAL

## 2025-01-29 VITALS
DIASTOLIC BLOOD PRESSURE: 72 MMHG | RESPIRATION RATE: 16 BRPM | OXYGEN SATURATION: 98 % | HEIGHT: 67 IN | SYSTOLIC BLOOD PRESSURE: 112 MMHG | HEART RATE: 98 BPM | TEMPERATURE: 96.98 F

## 2025-01-29 DIAGNOSIS — C61 MALIGNANT NEOPLASM OF PROSTATE: ICD-10-CM

## 2025-01-29 PROCEDURE — 99214 OFFICE O/P EST MOD 30 MIN: CPT

## 2025-07-09 NOTE — DISEASE MANAGEMENT
Physical Therapy    Visit Type: initial evaluation and treatment  SUBJECTIVE  \"Oh wow!\" \"That's funny.\"   Patient / Family Goal: maximize function Go to rehab    Pain   Patient denies pain., Patient does not demonstrate pain behaviors.     OBJECTIVE     Cognitive Status   Level of Consciousness   - alert and drowsy  Orientation    - Oriented to: person   - Disoriented to: time, place and situation  Functional Communication   - Overall Communication Status: impaired   - Forms of Communication: verbal, nods/gestures appropriately and delayed responses  Attention Span    - Attention: impaired   - Attention impairment: fatigue and internal factors  Following Direction   - follows one step commands with repetition and follows one step commands with increased time  Memory   - impaired - decreased recall of recent events - decreased short term memory  Safety Awareness/Insight   - impulsive, decreased awareness of need for safety and decreased awareness of need for assistance  Awareness of Deficits   - assistance required to compensate for deficits and not aware of deficits  Reoriented; following ~75% simple commands with repetition and increased time. Intermittent cues for safety.     Vitals:  Pre-activity in bed:  O2 Saturation 94% on 2L O2 nasal cannula  Heart Rate 56-60 bpm    With activity (transfer to chair), seated:  O2 Saturation 91% on 2L O2 nasal cannula   Heart Rate 57 bpm  BP right upper extremity 114/73 mmHg  Increased fatigue, intermittently dozing off in chair    Patient Activity Tolerance: 1 to 1 activity to rest    Observation   Cholecystostomy tube in place throughout    Range of Motion (ROM)   (degrees unless noted; active unless noted; norms in ( ); negative=lacking to 0, positive=beyond 0)  WFL: MADHAVE RUDEMETRICE  WFL: LLE, RLE    Strength  (out of 5 unless noted, standard test position unless noted)   WFL: MADHAVE, RUDEMETRICE  Knee:    - Extension:         Left: 5         Right: 5  Ankle:    - Dorsiflexion:         Left:  5         Right: 5    - Plantar Flexion:         Left: 5         Right: 5      Sitting Balance  (LADARIUS = base of support)  Static      - Trial 1 details: stand by assist  Dynamic      - Trial 1 details: minimal assist  Posterior lean when scooting and feet off floor; improved to stand by assist with bilateral upper extremity support and feet on floor    Standing Balance  (LADARIUS = base of support)  Firm Surface: Double Leg      - Static, Eyes Open       - Trial 1 details: minimal assist and with double UE support     - Dynamic, Eyes Open       - Trial 1 details: moderate assist and with double UE support       Bed Mobility  - Supine to sit: moderate assist, with tactile cues, with verbal cues  Use of bed rail with head of bed 30 degrees. Cues to initiate and hand over hand to sequence. Assist for trunk and to move bilateral lower extremity towards edge of bed.   Transfers  Assistive devices: gait belt, 2-wheeled walker  Description: BUE use, retropulsive and poor eccentric control  - Sit to stand: moderate assist, minimal assist, with verbal cues, with tactile cues  - Stand to sit: moderate assist, minimal assist, with verbal cues, with tactile cues  - Stand pivot: moderate assist, with tactile cues, with verbal cues  1 from edge of bed, 1 from chair, progressed from moderate assist to minimal assist, cues for hand placement. Posterior lean with increased speed during pivot, moderate assist and cues to maintain balance.    Ambulation / Gait  - Assistive device: gait belt and 2-wheeled walker  - Distance (feet unless otherwise indicated): 2  - Assist Level: moderate assist and with verbal cues  - Surface: even  - Description: shuffling, unsteady and retropulsive    Steps to chair      Interventions     Training provided: activity tolerance, bed mobility training, use of assistive device, safety training, transfer training and cognitive training    Skilled input: Verbal instruction/cues and tactile instruction/cues  Verbal  [1] : T1 Consent: Writer verbally educated and received verbal consent for hand placement, positioning of patient, and techniques to be performed today from patient for therapist position for techniques, hand placement and palpation for techniques and clothing adjustments for techniques as described above and how they are pertinent to the patient's plan of care.         Education:   - Present and ready to learn: patient and patient's family (educated daughter Simin via phone)  Education provided during session:  - transfer technique, safety, use of assistive device, role of PT, bed mobility techniques, fall prevention and cognitive strategies  - Results of above outlined education: Verbalizes understanding, Demonstrates understanding and Needs reinforcement    ASSESSMENT   Patient will benefit from skilled therapy to address listed impairments and functional limitations.  Interfering components: decreased activity tolerance, cognitive deficits and decreased insight into deficit    Discharge needs based on today's assessment:   - Current level of function: significantly below baseline level of function   - Therapy needs at discharge: therapy 5 or more times per week   - Activities of daily living (ADLs) requiring support at discharge: bed mobility, transfers and ambulation   - Instrumental activities of daily living (IADLs) requiring support at discharge: home management, meal preparation, community mobility and shopping   - Impairments that require further therapy intervention: activity tolerance, balance, cognition, strength and safety awareness      Patient intermittently drowsy at start of session and with fatigue, easily arousable. Pleasantly confused, follows most simple commands with time and repetition. Limited to transfer to chair due to functional weakness, balance deficits, decreased activity tolerance. To continue to progress as able.     Erick Duval called during session, provided update via phone with patient  [c] : c [0] : M0 permission and discussed discharge planning. Also educated on goals to keep patient awake during day with lights on to regulate sleep wake cycles. Daughter with good understanding.     AM-PAC  - Generalized Prior Level of Function: Needs a little help ([facility] 12-21)       Key: MOD A=moderate assistance, IND/MOD I=independent/modified independent  - Generalized Current Level of Function     - Current Mobility Score: 10       AM-PAC Scoring Key= >21 Modified Independent; 20-21 Supervision; 18-19 Minimal assist; 13-18 Moderate assist; 9-12 Max assist; <9 Total assist    Therapy Diagnosis:  Other Abnormalities of Gait and Mobility         Predicted patient presentation: Moderate (evolving) - Patient comorbidities and complexities, as defined above, may have varying impact on steady progress for prescribed plan of care.    Pain at End of Session:   Pain: none reported.    PLAN (while hospitalized)  Suggestions for next session as indicated:   PT Frequency: 3-5 x per week      PT/OT Mobility Equipment for Discharge: has rolling walker; possibly wheelchair    A minimum of 8 minutes per session x 1 week in the acute setting.   Interventions: bed mobility, balance, functional transfer training, safety education, gait training, strengthening, energy conservation, stairs retraining and cognitive reorientation (or training)  Agreement to plan and goals: patient agrees with goals and treatment plan and family/significant other/substitute decision maker agrees        GOALS  Review Date: 7/9/2025  Long Term Goals: (to be met by time of discharge from hospital)  Sit to supine: Patient will complete sit to supine stand by assist.  Supine to sit: Patient will complete supine to sit stand by assist.  Sit to stand: Patient will complete sit to stand transfer with 2-wheeled walker, stand by assist.   Stand to sit: Patient will complete stand to sit transfer with 2-wheeled walker, stand by assist.   Ambulation (even): Patient will  [0-10] : 0 -10 ng/mL ambulate on even surface for 50 feet with 2-wheeled walker, stand by assist.   Flight of stairs: Patient will ambulate a flight of stairs with gait belt using 1 rail, minimal assist.   Documented in the chart in the following areas:  Prior Function. Assessment/Plan.       Patient at End of Session:   Location: in chair  Safety measures: alarm system in place/re-engaged, call light within reach, lines intact and equipment intact  Handoff to: nurse (in person to Baldemar)        Therapy procedure time and total treatment time can be found documented on the Time Entry flowsheet   [] : Patient had a Prostate MRI [3] : 3 [IIC] : IIC [Radiation Therapy] : Radiation Therapy [Treatment with radiation therapy] : Treatment with radiation therapy [EBRT] : EBRT [LDR] : LDR [Treatment with androgen ablation] : Treatment with androgen ablation [TotalCores] : 15 [TotalPositiveCores] : 11 [MaxCoreInvolvement] : 70% [RadiationCompletedDate] : 10/16/19 [EBRTDose] : 0737 [EBRTFractions] : 25 [LDRDose] : 100

## 2025-07-22 NOTE — ASU DISCHARGE PLAN (ADULT/PEDIATRIC) - D. IF YOU HAD ANY TYPE OF SEDATION, YOU MAY EXPERIENCE LIGHTHEADEDNESS, DIZZINESS, OR SLEEPINESS FOLLOWING YOUR PROCEDURE. A RESPONSIBLE ADULT SHOULD STAY WITH YOU FOR AT LEAST 24 HOURS FOLLOWING YOUR PROCEDURE.
Refill:  0        Sabi Mcdaniels MD    [facility] RHEUMATOLOGY  [address]  [facility] [address] [phone]  
Statement Selected

## 2025-08-05 ENCOUNTER — APPOINTMENT (OUTPATIENT)
Dept: RADIATION ONCOLOGY | Facility: CLINIC | Age: 70
End: 2025-08-05
Payer: COMMERCIAL

## 2025-08-05 VITALS
SYSTOLIC BLOOD PRESSURE: 141 MMHG | TEMPERATURE: 96.98 F | RESPIRATION RATE: 16 BRPM | OXYGEN SATURATION: 99 % | DIASTOLIC BLOOD PRESSURE: 94 MMHG | HEIGHT: 67 IN | HEART RATE: 76 BPM

## 2025-08-05 PROCEDURE — 99214 OFFICE O/P EST MOD 30 MIN: CPT

## 2025-08-06 LAB — PSA SERPL-MCNC: <0.01 NG/ML

## 2025-08-18 ENCOUNTER — APPOINTMENT (OUTPATIENT)
Dept: OTHER | Facility: CLINIC | Age: 70
End: 2025-08-18
Payer: COMMERCIAL

## 2025-08-18 VITALS
RESPIRATION RATE: 16 BRPM | HEART RATE: 75 BPM | TEMPERATURE: 97.9 F | WEIGHT: 187 LBS | OXYGEN SATURATION: 95 % | DIASTOLIC BLOOD PRESSURE: 83 MMHG | BODY MASS INDEX: 29.35 KG/M2 | HEIGHT: 67 IN | SYSTOLIC BLOOD PRESSURE: 117 MMHG

## 2025-08-18 DIAGNOSIS — Z04.9 ENCOUNTER FOR EXAMINATION AND OBSERVATION FOR UNSPECIFIED REASON: ICD-10-CM

## 2025-08-18 DIAGNOSIS — J31.0 CHRONIC RHINITIS: ICD-10-CM

## 2025-08-18 DIAGNOSIS — J68.4 CHRONIC RESPIRATORY CONDITIONS DUE TO CHEMICALS, GASES, FUMES AND VAPORS: ICD-10-CM

## 2025-08-18 DIAGNOSIS — C61 MALIGNANT NEOPLASM OF PROSTATE: ICD-10-CM

## 2025-08-18 PROCEDURE — 99215 OFFICE O/P EST HI 40 MIN: CPT | Mod: 25

## 2025-08-18 PROCEDURE — 94010 BREATHING CAPACITY TEST: CPT

## 2025-08-18 PROCEDURE — 99397 PER PM REEVAL EST PAT 65+ YR: CPT | Mod: 25

## 2025-08-19 ENCOUNTER — NON-APPOINTMENT (OUTPATIENT)
Age: 70
End: 2025-08-19

## 2025-08-19 LAB
ALBUMIN SERPL ELPH-MCNC: 4.5 G/DL
ALP BLD-CCNC: 54 U/L
ALT SERPL-CCNC: 28 U/L
ANION GAP SERPL CALC-SCNC: 12 MMOL/L
AST SERPL-CCNC: 22 U/L
BASOPHILS # BLD AUTO: 0.05 K/UL
BASOPHILS NFR BLD AUTO: 1.1 %
BILIRUB SERPL-MCNC: 0.4 MG/DL
BUN SERPL-MCNC: 14 MG/DL
CALCIUM SERPL-MCNC: 10 MG/DL
CHLORIDE SERPL-SCNC: 102 MMOL/L
CHOLEST SERPL-MCNC: 225 MG/DL
CO2 SERPL-SCNC: 26 MMOL/L
CREAT SERPL-MCNC: 1.34 MG/DL
EGFRCR SERPLBLD CKD-EPI 2021: 58 ML/MIN/1.73M2
EOSINOPHIL # BLD AUTO: 0.33 K/UL
EOSINOPHIL NFR BLD AUTO: 7.4 %
GLUCOSE SERPL-MCNC: 92 MG/DL
HCT VFR BLD CALC: 43.4 %
HDLC SERPL-MCNC: 71 MG/DL
HGB BLD-MCNC: 14.2 G/DL
IMM GRANULOCYTES NFR BLD AUTO: 0 %
LDLC SERPL-MCNC: 142 MG/DL
LYMPHOCYTES # BLD AUTO: 1.58 K/UL
LYMPHOCYTES NFR BLD AUTO: 35.5 %
MAN DIFF?: NORMAL
MCHC RBC-ENTMCNC: 31.5 PG
MCHC RBC-ENTMCNC: 32.7 G/DL
MCV RBC AUTO: 96.2 FL
MONOCYTES # BLD AUTO: 0.4 K/UL
MONOCYTES NFR BLD AUTO: 9 %
NEUTROPHILS # BLD AUTO: 2.09 K/UL
NEUTROPHILS NFR BLD AUTO: 47 %
NONHDLC SERPL-MCNC: 154 MG/DL
PLATELET # BLD AUTO: 247 K/UL
POTASSIUM SERPL-SCNC: 4.5 MMOL/L
PROT SERPL-MCNC: 7.6 G/DL
RBC # BLD: 4.51 M/UL
RBC # FLD: 13.1 %
SODIUM SERPL-SCNC: 140 MMOL/L
TRIGL SERPL-MCNC: 69 MG/DL
WBC # FLD AUTO: 4.45 K/UL

## 2025-08-21 ENCOUNTER — TRANSCRIPTION ENCOUNTER (OUTPATIENT)
Age: 70
End: 2025-08-21

## 2025-08-25 ENCOUNTER — APPOINTMENT (OUTPATIENT)
Dept: GASTROENTEROLOGY | Facility: CLINIC | Age: 70
End: 2025-08-25

## (undated) DEVICE — Device

## (undated) DEVICE — SENSOR O2 FINGER ADULT 24/CA

## (undated) DEVICE — TUBING IV SET GRAVITY 3Y 100" MACRO

## (undated) DEVICE — CATH ELCTR GLIDE PRB 7FR

## (undated) DEVICE — TUBING MEDI-VAC W MAXIGRIP CONNECTORS 1/4"X6'

## (undated) DEVICE — KIT ENDO PROCEDURE CUST W/VLV

## (undated) DEVICE — RETRIEVER ROTH NET PLATINUM-UNIVERSAL

## (undated) DEVICE — PLATE NESSY 170

## (undated) DEVICE — SYR LUER LOK 50CC

## (undated) DEVICE — LUBRICATING JELLY ONESHOT 1.25OZ

## (undated) DEVICE — NDL INJ SCLERO INTERJECT 23G

## (undated) DEVICE — ADAPTER ENDO CHNL SINGLE USE

## (undated) DEVICE — SOL INJ NS 0.9% 500ML 1-PORT

## (undated) DEVICE — FORMALIN CONTAINER MED

## (undated) DEVICE — CLAMP BX HOT RAD JAW 3

## (undated) DEVICE — TUBING CANNULA SALTER LABS NASAL ADULT 7FT

## (undated) DEVICE — SNARE CAPTIVATOR II 15MM

## (undated) DEVICE — SNARE LOOP POLY DISP 30MM LOOP

## (undated) DEVICE — DRSG CURITY GAUZE SPONGE 4 X 4" 12-PLY

## (undated) DEVICE — FORCEP BIOPSY 2.5MM DISP

## (undated) DEVICE — STERIS DEFENDO 3-PIECE KIT (AIR/WATER, SUCTION & BIOPSY VALVES)